# Patient Record
Sex: FEMALE | Race: ASIAN | Employment: UNEMPLOYED | URBAN - METROPOLITAN AREA
[De-identification: names, ages, dates, MRNs, and addresses within clinical notes are randomized per-mention and may not be internally consistent; named-entity substitution may affect disease eponyms.]

---

## 2017-01-23 ENCOUNTER — OFFICE VISIT (OUTPATIENT)
Dept: ENDOCRINOLOGY | Age: 62
End: 2017-01-23

## 2017-01-23 VITALS
BODY MASS INDEX: 28.66 KG/M2 | HEIGHT: 61 IN | SYSTOLIC BLOOD PRESSURE: 125 MMHG | DIASTOLIC BLOOD PRESSURE: 74 MMHG | HEART RATE: 88 BPM | WEIGHT: 151.8 LBS

## 2017-01-23 DIAGNOSIS — E78.5 HYPERLIPIDEMIA LDL GOAL <100: ICD-10-CM

## 2017-01-23 DIAGNOSIS — L65.9 HAIR LOSS: ICD-10-CM

## 2017-01-23 DIAGNOSIS — E55.9 VITAMIN D DEFICIENCY: ICD-10-CM

## 2017-01-23 DIAGNOSIS — E83.52 HYPERCALCEMIA: ICD-10-CM

## 2017-01-23 DIAGNOSIS — I10 ESSENTIAL HYPERTENSION, BENIGN: ICD-10-CM

## 2017-01-23 LAB — HBA1C MFR BLD HPLC: 7.9 %

## 2017-01-23 RX ORDER — PRAVASTATIN SODIUM 40 MG/1
TABLET ORAL
Qty: 90 TAB | Refills: 3 | Status: SHIPPED | OUTPATIENT
Start: 2017-01-23 | End: 2017-01-23 | Stop reason: SDUPTHER

## 2017-01-23 RX ORDER — LISINOPRIL 20 MG/1
TABLET ORAL
Qty: 90 TAB | Refills: 3 | Status: SHIPPED | OUTPATIENT
Start: 2017-01-23 | End: 2018-02-22 | Stop reason: SDUPTHER

## 2017-01-23 RX ORDER — METFORMIN HYDROCHLORIDE 1000 MG/1
TABLET ORAL
Qty: 180 TAB | Refills: 3 | Status: SHIPPED | OUTPATIENT
Start: 2017-01-23 | End: 2018-02-22 | Stop reason: SDUPTHER

## 2017-01-23 RX ORDER — GEMFIBROZIL 600 MG/1
TABLET, FILM COATED ORAL
Qty: 180 TAB | Refills: 3 | Status: SHIPPED | OUTPATIENT
Start: 2017-01-23 | End: 2017-07-10 | Stop reason: SINTOL

## 2017-01-23 RX ORDER — PRAVASTATIN SODIUM 40 MG/1
TABLET ORAL
Qty: 90 TAB | Refills: 3
Start: 2017-01-23 | End: 2018-02-22 | Stop reason: SDUPTHER

## 2017-01-23 NOTE — MR AVS SNAPSHOT
Visit Information Date & Time Provider Department Dept. Phone Encounter #  
 1/23/2017 11:50 AM Micki Monique, [address] [facility] and Endocrinology  Follow-up Instructions Return in about 5 months (around 6/23/2017).  
  
 2/28/2017  4:40 PM  
EWL OVER 50 with Marcelina Reid MD  
[address] ([address] [facility] [address] [facility] [address] [facility] [id number]  
[phone]  
  
   
 [address] [facility]   [facility] [address] [facility] Upcoming Health Maintenance Date Due Hepatitis C Screening 1955 COLONOSCOPY 5/1/1973 Pneumococcal 19-64 Medium Risk (1 of 1 - PPSV23) 5/1/1974 DTaP/Tdap/Td series (1 - Tdap) 5/1/1976 ZOSTER VACCINE AGE 60> 5/1/2015 INFLUENZA AGE 9 TO ADULT 8/1/2016 HEMOGLOBIN A1C Q6M 11/9/2016 EYE EXAM RETINAL OR DILATED Q1 3/31/2017 FOOT EXAM Q1 5/9/2017 MICROALBUMIN Q1 5/9/2017 LIPID PANEL Q1 5/9/2017 PAP AKA CERVICAL CYTOLOGY 1/10/2018 BREAST CANCER SCRN MAMMOGRAM 2/23/2018 Allergies as of 1/23/2017  Review Complete On: 1/23/2017 By: Micki Monique MD  
  
 Severity Noted Reaction Type Reactions Carrot Medium 11/03/2010   Systemic Swelling Simvastatin  07/22/2011    Myalgia Current Immunizations  Reviewed on 11/24/2014 No immunizations on file. Not reviewed this visit You Were Diagnosed With   
  
 Codes Comments Uncontrolled type 2 diabetes mellitus with complication, with long-term current use of insulin (HCC)    -  Primary ICD-10-CM: E11.8, E11.65, Z79.4 ICD-9-CM: 250.82, V58.67 Essential hypertension, benign     ICD-10-CM: I10 
ICD-9-CM: 401.1 Hyperlipidemia LDL goal <100     ICD-10-CM: E78.5 ICD-9-CM: 272.4 Hair loss     ICD-10-CM: L65.9 ICD-9-CM: 704.00 Vitals  BP Pulse Height(growth percentile) Weight(growth percentile) LMP BMI  
 125/74 (BP 1 Location: Left arm, BP Patient Position: Sitting) 88 5' 1\" (1.549 m) 151 lb 12.8 oz (68.9 kg) 06/28/2010 28.68 kg/m2 OB Status Smoking Status Postmenopausal Former Smoker Vitals History BMI and BSA Data Body Mass Index Body Surface Area  
 28.68 kg/m 2 1.72 m 2 Preferred Pharmacy Pharmacy Name Phone [facility] Pavithra Casper [address] [place] [address] Kayley Dotson [phone] Your Updated Medication List  
  
   
This list is accurate as of: 1/23/17  1:09 PM.  Always use your most recent med list.  
  
  
  
  
 aspirin 81 mg tablet Take 81 mg by mouth daily. CO Q-10 100 mg capsule Generic drug:  co-enzyme Q-10 Take 100 mg by mouth daily. dulaglutide 0.75 mg/0.5 mL sub-q pen Commonly known as:  TRULICITY  
0.5 mL by SubCUTAneous route every seven (7) days. FREESTYLE LITE METER monitoring kit Generic drug:  Blood-Glucose Meter Test 4 times daily--Dx: E11.65 FREESTYLE LITE STRIPS strip Generic drug:  glucose blood VI test strips Test 4 times daily--Dx: E11.65--[place]  
  
 gemfibrozil 600 mg tablet Commonly known as:  LOPID TAKE ONE TABLET BY MOUTH TWICE DAILY  
  
 insulin glargine 100 unit/mL (3 mL) pen Commonly known as:  LANTUS SOLOSTAR  
45 Units by SubCUTAneous route. insulin lispro 100 unit/mL kwikpen Commonly known as:  HumaLOG KwikPen  
by SubCUTAneous route. 3 U PER 15 G OF CARBS  
  
 lisinopril 20 mg tablet Commonly known as:  PRINIVIL, ZESTRIL  
TAKE ONE TABLET BY MOUTH ONCE DAILY. metFORMIN 1,000 mg tablet Commonly known as:  GLUCOPHAGE  
TAKE ONE TABLET BY MOUTH TWICE DAILY WITH MEALS  
  
 phentermine 37.5 mg tablet Commonly known as:  ADIPEX-P Take 1 tablet before breakfast  
  
 pravastatin 40 mg tablet Commonly known as:  PRAVACHOL  
TAKE 1/2 TABLET BY MOUTH ONCE DAILY--Dose change 1/23/17--updated med list--did not send prescription to the pharmacy Prescriptions Sent to Pharmacy Refills  
 lisinopril (PRINIVIL, ZESTRIL) 20 mg tablet 3 Sig: TAKE ONE TABLET BY MOUTH ONCE DAILY. Class: Normal  
 Pharmacy: [facility], [address] Ph #: [phone] We Performed the Following AMB POC HEMOGLOBIN A1C [21708 CPT(R)] CBC W/O DIFF [62502 CPT(R)] LIPID PANEL [51711 CPT(R)] METABOLIC PANEL, COMPREHENSIVE [28580 CPT(R)] IN COLLECTION VENOUS BLOOD,VENIPUNCTURE [id number] CPT(R)] IN HANDLG&/OR CONVEY OF SPEC FOR TR OFFICE TO LAB [74404 CPT(R)] TSH 3RD GENERATION [81674 CPT(R)] VITAMIN D, 25 HYDROXY [id number] CPT(R)] Follow-up Instructions Return in about 5 months (around 6/23/2017). Patient Instructions 1) Try taking 1/2 tab of pravastatin at night to see if this has any effect on memory. 2)  I will send you a message through [employer] with your lab results. 3) Your Hemoglobin A1c is a 3 month marker of your diabetes control. Goal is less than 7% which means your average blood sugar is less than 150. Your Hemoglobin A1c is 7.9% which means your diabetes is under slightly worse control than 7.8% at your last check. Continue to work on your diet and exercise and take all your medications (metformin and lantus and humalog) as directed. 4) We will try Trulicity. Take this once a week either in the morning or in the evening and it doesn't matter if this is before or after a meal.  The most common side effect is nausea. Watch out for any severe abdominal pain that goes to the back and is associated with nausea or vomiting as this may be due to pancreatitis which is the most severe but rarest side effect of this medication. Please notify me if this occurs.   Take your 2nd sample pen dose in one week and if you are tolerating this, let me know and I can try to contact Radha to see if we can add this on to your patient assistance program. 
 
 5) Decrease the humalog to 10 units with meals. If you are finding that you are having low sugars under 90 more than 2 times a week, then cut back by 2 units on the humalog as needed until you get to 4 units with meals and if still well controlled with readings under 90, then stop the humalog. 6) I will check you for anemia and thyroid and vitamin D for other causes of hair loss. Introducing [facility]! Dear [address] [place] [address]: 
Thank you for requesting a [employer] account. Our records indicate that you already have an active [employer] account. You can access your account anytime at [email or web address] Did you know that you can access your hospital and ER discharge instructions at any time in [employer]? You can also review all of your test results from your hospital stay or ER visit. Additional Information If you have questions, please visit the Frequently Asked Questions section of the [employer] website at [email or web address] Remember, [employer] is NOT to be used for urgent needs. For medical emergencies, dial 911. Now available from your iPhone and Android! Please provide this summary of care documentation to your next provider. Your primary care clinician is listed as [place]. If you have any questions after today's visit, please call [phone].

## 2017-01-23 NOTE — PROGRESS NOTES
Chief Complaint   Patient presents with    Diabetes     pcp and pharmacy confirmed    Labs     drawn. .. POC done     History of Present Illness: Maria Teresa Boswell is a 64 y.o. female here for follow up of diabetes. Weight down 7 lbs since last visit in 5/16. Her 81 yo mother is still dealing with brain tumors. Her 58 yo brother passed away in 9/16 of MI. Her youngest brother has amputation from DM and recently suffered a subarachnoid hemorrhage after a car accident. Never went up to 50 of lantus and stayed on 45 the whole time and tried to watch her portions. Moved her lisinopril to bedtime and takes with her pravastatin. Fasting sugars are in the 120-130s. Has had more trouble trouble with hair loss and memory loss with some word finding difficulty. She is interested in trying trulicity as she is already on the [place] pt assistance program.  We gave her the first injection of 0.75 mg today in the office. Current Outpatient Prescriptions   Medication Sig    metFORMIN (GLUCOPHAGE) 1,000 mg tablet TAKE ONE TABLET BY MOUTH TWICE DAILY WITH MEALS    gemfibrozil (LOPID) 600 mg tablet TAKE ONE TABLET BY MOUTH TWICE DAILY    phentermine (ADIPEX-P) 37.5 mg tablet Take 1 tablet before breakfast    pravastatin (PRAVACHOL) 40 mg tablet TAKE ONE TABLET BY MOUTH ONCE DAILY.  lisinopril (PRINIVIL, ZESTRIL) 20 mg tablet TAKE ONE TABLET BY MOUTH ONCE DAILY.  FREESTYLE LITE STRIPS strip Test 4 times daily--Dx: E11.65--[place]    FREESTYLE LITE METER monitoring kit Test 4 times daily--Dx: E11.65    insulin glargine (LANTUS SOLOSTAR) 100 unit/mL (3 mL) pen 45 Units by SubCUTAneous route.  insulin lispro (HUMALOG KWIKPEN) 100 unit/mL kwikpen by SubCUTAneous route. 3 U PER 15 G OF CARBS    coenzyme q10 (CO Q-10) 100 mg Cap Take 100 mg by mouth daily.  aspirin 81 mg tablet Take 81 mg by mouth daily. No current facility-administered medications for this visit. Allergies   Allergen Reactions    Carrot Swelling    Simvastatin Myalgia     Review of Systems:  - Eyes: no blurry vision or double vision  - Cardiovascular: no chest pain  - Respiratory: no shortness of breath  - Musculoskeletal: no myalgias  - Neurological: no numbness/tingling in extremities    Physical Examination:  Blood pressure 125/74, pulse 88, height 5' 1\" (1.549 m), weight 151 lb 12.8 oz (68.9 kg), last menstrual period 06/28/2010.  - General: pleasant, no distress, good eye contact   - Neck: no carotid bruits  - Cardiovascular: regular, normal rate, nl s1 and s2, no m/r/g,   - Respiratory: clear bilaterally  - Integumentary: no edema,   - Psychiatric: normal mood and affect    Data Reviewed:   Component      Latest Ref Rng & Units 1/23/2017          12:27 PM   Hemoglobin A1c (POC)      % 7.9       Assessment/Plan:     1. DM w/o complication type II, uncontrolled (250.02) her most recent Hgb A1c was 7.9% in 1/17 up from 7.8% in 5/16 down from 8.9% in 2/16 up from 8.1% in 5/15 up from 7.5% in 1/15 down from 8.1% in 7/14 up from 7.1% in 4/14 in [facility] down from 7.2% in 11/13 up from 6.9% in 8/13 down from 7.3% in 4/13 up from 6.9% in Nov up from 6.2% in August down from 6.5% in April up from 6.4% in December down from 6.9% in May 2011 down from 7.9% in November 2010 down from 9.2% in March 2010 prior to starting insulin. Will try trulicity to help with weight loss and cut back on insulin requirements. - cont Lantus 45 units at bedtime     - cont Humalog 3 units for 15 grams of carb--then change to novolog--cut back on this as below  - cont Metformin 1g bid  - begin trulicity 5.84 mg weekly  - foot exam done 5/16  - optho UTD 4/13  - microalbumin 153 11/10 down to 30.1 in 1/12, up to 58 in 4/13 (increased lis to 20 at that time) and down to 25 in 8/13, up to 47 in 5/15 and stable at 47 in 5/16  - check bs 3-4x day   - check Hgb A1c and cmp and microalbumin at next visit     2.  Unspecified essential hypertension (401.9) her BP was at goal < 140/90   - cont lisinopril 20 mg daily     3. Other and unspecified hyperlipidemia (272.4) Given DM, Goal LDL < 100, non-HDL < 130, and TG < 150. Myalgias with simvastatin. Was changed from gemfibrozil to pravastatin in May 2012.  with non-HDL of 149 at that time off therapy down to 62 and 126 in August. TGs > 500 in 11/12 and down to 353 in 4/13 but up to 741 in 8/13 so restarted gemfibrozil at that time. TG down to 285 in 11/13. Up to 343 in 7/14. Down to 183 in 1/15. LDL 85 and  in 5/15.  and  in 2/16. LDL 65 and  in 5/16 with lower A1c. Having some memory loss so will try lower dose of pravastatin to see if this helps. - cont gemfibrozil 600 mg bid  - decrease prava to 1/2 of 40 mg daily  - check lipids today     4. Unspecified vitamin D deficiency (268.9) Level was 19 in November 2010 on 2000 units daily so increased to 4000 units daily and it was up to 26.3 in May 2011. On 5000 units daily her level was 24 in January 2012 so I increased her to 10,000 units daily at that time and level was 68 in April 2012 and 62 in August and 72 in November and 69 in 4/13 so decreased her dose to 5000 units at that time and level 40 in 8/13. Calcium level was up in 11/13 to 11.0 so stopped vitamin D at that time and level 32 in 5/15. Down to 24 in 2/16  - no vitamin D for now  - check Vitamin D 25-OH level today       5. Hypercalcemia: Had a level of 10.4 in 1/12 and no other abnormal values until 10.5 in 8/13 and up to 11 in 11/13. Stopped vitamin D and mvi at that time. Down to 10 in 6/14. Found to have a 2 mm stone on CT scan in [facility] in 4/14. Will hold on further evaluation given she doesn't have insurance at this time but will plan on drawing a PTH level in the future. Repeat calcium was 11 in 1/15 and 10.4 in 5/15. Up to 10.9 in 2/16 but down to 10.2 in 5/16.    - follow on cmp    6.   Obesity: weight down 8 lbs from 5/15 to 2/16 and 2 lbs by 5/16 but only taking 1/2 tab in am consistently and down 5 lbs by 1/17. Trulicity should help too. - cont phentermine 37.5 mg but take 1 whole tab daily  - trulicity as above        Patient Instructions   1) Try taking 1/2 tab of pravastatin at night to see if this has any effect on memory. 2)  I will send you a message through [employer] with your lab results. 3) Your Hemoglobin A1c is a 3 month marker of your diabetes control. Goal is less than 7% which means your average blood sugar is less than 150. Your Hemoglobin A1c is 7.9% which means your diabetes is under slightly worse control than 7.8% at your last check. Continue to work on your diet and exercise and take all your medications (metformin and lantus and humalog) as directed. 4) We will try Trulicity. Take this once a week either in the morning or in the evening and it doesn't matter if this is before or after a meal.  The most common side effect is nausea. Watch out for any severe abdominal pain that goes to the back and is associated with nausea or vomiting as this may be due to pancreatitis which is the most severe but rarest side effect of this medication. Please notify me if this occurs. Take your 2nd sample pen dose in one week and if you are tolerating this, let me know and I can try to contact Radha to see if we can add this on to your patient assistance program.    5) Decrease the humalog to 10 units with meals. If you are finding that you are having low sugars under 90 more than 2 times a week, then cut back by 2 units on the humalog as needed until you get to 4 units with meals and if still well controlled with readings under 90, then stop the humalog. 6) I will check you for anemia and thyroid and vitamin D for other causes of hair loss. Follow-up Disposition:  Return in about 5 months (around 6/23/2017).     Copy sent to:  Dr. Richard Salazar via [facility]    Lab follow up: 1/24/17    Component      Latest Ref Rng & Units 1/23/2017 1/23/2017 1/23/2017 1/23/2017           4:59 PM  4:59 PM  4:59 PM  4:59 PM   Glucose      65 - 99 mg/dL 129 (H)      BUN      8 - 27 mg/dL 16      Creatinine      0.57 - 1.00 mg/dL 0.68      GFR est non-AA      >59 mL/min/1.73 95      GFR est AA      >59 mL/min/1.73 109      BUN/Creatinine ratio      11 - 26 24      Sodium      134 - 144 mmol/L 135      Potassium      3.5 - 5.2 mmol/L 5.1      Chloride      96 - 106 mmol/L 96      CO2      18 - 29 mmol/L 20      Calcium      8.7 - 10.3 mg/dL 10.2      Protein, total      6.0 - 8.5 g/dL 8.1      Albumin      3.6 - 4.8 g/dL 4.7      GLOBULIN, TOTAL      1.5 - 4.5 g/dL 3.4      A-G Ratio      1.1 - 2.5 1.4      Bilirubin, total      0.0 - 1.2 mg/dL 0.4      Alk.  phosphatase      39 - 117 IU/L 100      AST      0 - 40 IU/L 31      ALT      0 - 32 IU/L 30      WBC      3.4 - 10.8 x10E3/uL    8.0   RBC      3.77 - 5.28 x10E6/uL    4.69   HGB      11.1 - 15.9 g/dL    14.2   HCT      34.0 - 46.6 %    41.9   MCV      79 - 97 fL    89   MCH      26.6 - 33.0 pg    30.3   MCHC      31.5 - 35.7 g/dL    33.9   RDW      12.3 - 15.4 %    12.4   PLATELET      699 - 871 x10E3/uL    335   Cholesterol, total      100 - 199 mg/dL  160     Triglyceride      0 - 149 mg/dL  152 (H)     HDL Cholesterol      >39 mg/dL  43     VLDL, calculated      5 - 40 mg/dL  30     LDL, calculated      0 - 99 mg/dL  87     VITAMIN D, 25-HYDROXY      30.0 - 100.0 ng/mL   29.6 (L)      Component      Latest Ref Rng & Units 1/23/2017           4:59 PM   TSH      0.450 - 4.500 uIU/mL 1.310     Sent her the following message through [employer]:    TSH is a thyroid test.  Your level is normal so you don't have any problem with your thyroid at this time that needs further evaluation or treatment.   -------------------------------------------------------------------------------------------------------------------  Total Cholesterol is the total number of cholesterol particles in your blood. Goal is less than 200. Your value is at goal.    Triglycerides are the short term fats in your blood. Goal is less than 150. Your value is just above goal.    HDL is the good cholesterol in your blood. Goal is more than 50. Your value is below goal.    LDL is the bad cholesterol in your blood. Goal is less than 100. Your value is at goal.    As we discussed, I still think it makes sense to try 1/2 tab of pravastatin to see if this has any effect on your memory. Continue to follow a low cholesterol diet. Try to limit the amount of fried foods, fatty foods, butter, gravy, red meat, ice cream, cheese, and eggs in your diet, which are all high in cholesterol. Take all of your medications (pravastatin) as directed.  -------------------------------------------------------------------------------------------------------------------  BUN and creatinine are markers of kidney function. Your values are normal.  -------------------------------------------------------------------------------------------------------------------  ALT and AST are markers of liver function. Your values are normal.  -------------------------------------------------------------------------------------------------------------------  Your vitamin D level is 29.6 which is just slightly low. Goal is over 30. It's unclear if this could be contributing to hair loss. Previously we had stopped the vitamin D because your calcium level was high as vitamin D can cause your calcium level to go higher. Now that your calcium level remains normal, I think it's fine to try taking just 1000 units of vitamin D3 daily to see if this helps with any hair loss.    -------------------------------------------------------------------------------------------------------------------  Your CBC (complete blood count) was normal so you have no evidence of anemia as the cause of hair loss.

## 2017-01-23 NOTE — PATIENT INSTRUCTIONS
1) Try taking 1/2 tab of pravastatin at night to see if this has any effect on memory. 2)  I will send you a message through [employer] with your lab results. 3) Your Hemoglobin A1c is a 3 month marker of your diabetes control. Goal is less than 7% which means your average blood sugar is less than 150. Your Hemoglobin A1c is 7.9% which means your diabetes is under slightly worse control than 7.8% at your last check. Continue to work on your diet and exercise and take all your medications (metformin and lantus and humalog) as directed. 4) We will try Trulicity. Take this once a week either in the morning or in the evening and it doesn't matter if this is before or after a meal.  The most common side effect is nausea. Watch out for any severe abdominal pain that goes to the back and is associated with nausea or vomiting as this may be due to pancreatitis which is the most severe but rarest side effect of this medication. Please notify me if this occurs. Take your 2nd sample pen dose in one week and if you are tolerating this, let me know and I can try to contact Radha to see if we can add this on to your patient assistance program.    5) Decrease the humalog to 10 units with meals. If you are finding that you are having low sugars under 90 more than 2 times a week, then cut back by 2 units on the humalog as needed until you get to 4 units with meals and if still well controlled with readings under 90, then stop the humalog. 6) I will check you for anemia and thyroid and vitamin D for other causes of hair loss.

## 2017-01-24 LAB
25(OH)D3+25(OH)D2 SERPL-MCNC: 29.6 NG/ML (ref 30–100)
ALBUMIN SERPL-MCNC: 4.7 G/DL (ref 3.6–4.8)
ALBUMIN/GLOB SERPL: 1.4 {RATIO} (ref 1.1–2.5)
ALP SERPL-CCNC: 100 IU/L (ref 39–117)
ALT SERPL-CCNC: 30 IU/L (ref 0–32)
AST SERPL-CCNC: 31 IU/L (ref 0–40)
BILIRUB SERPL-MCNC: 0.4 MG/DL (ref 0–1.2)
BUN SERPL-MCNC: 16 MG/DL (ref 8–27)
BUN/CREAT SERPL: 24 (ref 11–26)
CALCIUM SERPL-MCNC: 10.2 MG/DL (ref 8.7–10.3)
CHLORIDE SERPL-SCNC: 96 MMOL/L (ref 96–106)
CHOLEST SERPL-MCNC: 160 MG/DL (ref 100–199)
CO2 SERPL-SCNC: 20 MMOL/L (ref 18–29)
CREAT SERPL-MCNC: 0.68 MG/DL (ref 0.57–1)
ERYTHROCYTE [DISTWIDTH] IN BLOOD BY AUTOMATED COUNT: 12.4 % (ref 12.3–15.4)
GLOBULIN SER CALC-MCNC: 3.4 G/DL (ref 1.5–4.5)
GLUCOSE SERPL-MCNC: 129 MG/DL (ref 65–99)
HCT VFR BLD AUTO: 41.9 % (ref 34–46.6)
HDLC SERPL-MCNC: 43 MG/DL
HGB BLD-MCNC: 14.2 G/DL (ref 11.1–15.9)
INTERPRETATION, 910389: NORMAL
LDLC SERPL CALC-MCNC: 87 MG/DL (ref 0–99)
MCH RBC QN AUTO: 30.3 PG (ref 26.6–33)
MCHC RBC AUTO-ENTMCNC: 33.9 G/DL (ref 31.5–35.7)
MCV RBC AUTO: 89 FL (ref 79–97)
PLATELET # BLD AUTO: 335 X10E3/UL (ref 150–379)
POTASSIUM SERPL-SCNC: 5.1 MMOL/L (ref 3.5–5.2)
PROT SERPL-MCNC: 8.1 G/DL (ref 6–8.5)
RBC # BLD AUTO: 4.69 X10E6/UL (ref 3.77–5.28)
SODIUM SERPL-SCNC: 135 MMOL/L (ref 134–144)
TRIGL SERPL-MCNC: 152 MG/DL (ref 0–149)
TSH SERPL DL<=0.005 MIU/L-ACNC: 1.31 UIU/ML (ref 0.45–4.5)
VLDLC SERPL CALC-MCNC: 30 MG/DL (ref 5–40)
WBC # BLD AUTO: 8 X10E3/UL (ref 3.4–10.8)

## 2017-01-24 RX ORDER — MELATONIN
2000 DAILY
COMMUNITY

## 2017-01-26 ENCOUNTER — TELEPHONE (OUTPATIENT)
Dept: ENDOCRINOLOGY | Age: 62
End: 2017-01-26

## 2017-01-26 NOTE — TELEPHONE ENCOUNTER
----- Message from Dorcas Yousif LPN sent at 6/07/9295  1:48 PM EST -----  Regarding: Humalog arrived  PAP Humalog arrived today. ----    Pt notified.

## 2017-02-01 ENCOUNTER — HOSPITAL ENCOUNTER (OUTPATIENT)
Dept: GENERAL RADIOLOGY | Age: 62
Discharge: HOME | End: 2017-02-01
Payer: SUBSIDIZED

## 2017-02-01 ENCOUNTER — OFFICE VISIT (OUTPATIENT)
Dept: FAMILY MEDICINE CLINIC | Age: 62
End: 2017-02-01

## 2017-02-01 ENCOUNTER — TELEPHONE (OUTPATIENT)
Dept: FAMILY MEDICINE CLINIC | Age: 62
End: 2017-02-01

## 2017-02-01 VITALS
BODY MASS INDEX: 29.72 KG/M2 | RESPIRATION RATE: 19 BRPM | TEMPERATURE: 98.4 F | SYSTOLIC BLOOD PRESSURE: 142 MMHG | HEIGHT: 61 IN | HEART RATE: 82 BPM | DIASTOLIC BLOOD PRESSURE: 84 MMHG | WEIGHT: 157.4 LBS | OXYGEN SATURATION: 96 %

## 2017-02-01 DIAGNOSIS — R68.89 FLU-LIKE SYMPTOMS: ICD-10-CM

## 2017-02-01 DIAGNOSIS — J02.0 STREP THROAT: Primary | ICD-10-CM

## 2017-02-01 DIAGNOSIS — J06.9 URI WITH COUGH AND CONGESTION: ICD-10-CM

## 2017-02-01 LAB
QUICKVUE INFLUENZA TEST: NEGATIVE
S PYO AG THROAT QL: POSITIVE
VALID INTERNAL CONTROL?: YES
VALID INTERNAL CONTROL?: YES

## 2017-02-01 PROCEDURE — 71020 XR CHEST PA LAT: CPT

## 2017-02-01 RX ORDER — AZITHROMYCIN 250 MG/1
TABLET, FILM COATED ORAL
Qty: 6 TAB | Refills: 0 | Status: SHIPPED | OUTPATIENT
Start: 2017-02-01 | End: 2017-04-06 | Stop reason: ALTCHOICE

## 2017-02-01 RX ORDER — AMOXICILLIN AND CLAVULANATE POTASSIUM 875; 125 MG/1; MG/1
1 TABLET, FILM COATED ORAL EVERY 12 HOURS
Qty: 20 TAB | Refills: 0 | Status: SHIPPED | OUTPATIENT
Start: 2017-02-01 | End: 2017-02-01

## 2017-02-01 RX ORDER — AMOXICILLIN AND CLAVULANATE POTASSIUM 875; 125 MG/1; MG/1
TABLET, FILM COATED ORAL EVERY 12 HOURS
COMMUNITY
End: 2017-04-06 | Stop reason: ALTCHOICE

## 2017-02-01 NOTE — PROGRESS NOTES
Phill Benitez is a 64 y.o. female who presents to the office today with the following:  Chief Complaint   Patient presents with    Sore Throat     patient has sore throat, fever, body aches, headache, cough       HPI  Sat running fevers on/off, once as high as 104.0F  Also c/o sore throat, hurts to swallow, feels very weak. Coughing up phlegm, unsure color. Also frequent HAs. Has been taking 500mg Tylenol for fever/ache. Also tried IBU initially, then switched. Did not receive flu vaccination. Hx Insulin dep [id number] started Trulicity last wk. Recently cared for young sick relative with similar sxs. Review of Systems   Constitutional: Positive for chills, fever and malaise/fatigue. HENT: Positive for congestion (but nose is running) and sore throat. Negative for ear pain. Eyes: Negative. Respiratory: Positive for cough. Negative for shortness of breath and wheezing. Cardiovascular: Negative for chest pain. Gastrointestinal: Negative for abdominal pain (denies, but notes some superficial soreness due to new injectable DM med (Trulicity)), diarrhea, nausea and vomiting. Genitourinary: Negative. Musculoskeletal: Positive for myalgias. Skin: Negative for rash. Neurological: Positive for headaches. Allergies   Allergen Reactions    Carrot Swelling    Apple Swelling     Apple skin causes her lips to swell    Simvastatin Myalgia       Current Outpatient Prescriptions   Medication Sig    cholecalciferol (VITAMIN D3) 1,000 unit tablet Take  by mouth daily.  metFORMIN (GLUCOPHAGE) 1,000 mg tablet TAKE ONE TABLET BY MOUTH TWICE DAILY WITH MEALS    gemfibrozil (LOPID) 600 mg tablet TAKE ONE TABLET BY MOUTH TWICE DAILY    lisinopril (PRINIVIL, ZESTRIL) 20 mg tablet TAKE ONE TABLET BY MOUTH ONCE DAILY.     pravastatin (PRAVACHOL) 40 mg tablet TAKE 1/2 TABLET BY MOUTH ONCE DAILY--Dose change 1/23/17--updated med list--did not send prescription to the pharmacy    dulaglutide (TRULICITY) 3.53 PL/6.4 mL sub-q pen 0.5 mL by SubCUTAneous route every seven (7) days.  phentermine (ADIPEX-P) 37.5 mg tablet Take 1 tablet before breakfast    FREESTYLE LITE STRIPS strip Test 4 times daily--Dx: E11.65--90 day supply as [place] the [place]    FREESTYLE LITE METER monitoring kit Test 4 times daily--Dx: E11.65    insulin glargine (LANTUS SOLOSTAR) 100 unit/mL (3 mL) pen 45 Units by SubCUTAneous route.  insulin lispro (HUMALOG KWIKPEN) 100 unit/mL kwikpen by SubCUTAneous route. 3 U PER 15 G OF CARBS    coenzyme q10 (CO Q-10) 100 mg Cap Take 100 mg by mouth daily.  aspirin 81 mg tablet Take 81 mg by mouth daily. No current facility-administered medications for this visit. Past Medical History   Diagnosis Date    Chest pain 2012     Saw Dr. Bouchra Ely, negative stress test    Diabetes [facility])     Diverticulosis      seen on CT scan    Hyperlipidemia     Hypertension     Obesity     Sensory neuropathy 2012     Tingling L toe    Tendonitis, Achilles, left 2012    Type II or unspecified type diabetes mellitus without mention of complication, uncontrolled     Vitamin D deficiency        Past Surgical History   Procedure Laterality Date    Pr tympanoplasty       left       Social History     Social History    Marital status:      Spouse name: N/A    Number of children: N/A    Years of education: N/A     Social History Main Topics    Smoking status: Former Smoker     Packs/day: 0.50     Years: 20.00     Quit date: 11/3/2004    Smokeless tobacco: Never Used    Alcohol use No    Drug use: No    Sexual activity: Yes     Partners: Male     Other Topics Concern    None     Social History Narrative    Lives in [place] with  of 10 years. Has a 33 yo daughter from a previous marriage. Used to work as a  for [employer] and a [employer] in [place] and for [employer]. Likes to read, cook, and shop.         Family History   Problem Relation Age of Onset    Diabetes Father     Heart Disease Father 46     MI    Diabetes Brother     Diabetes Other      multiple family members on father's side    Diabetes Brother     Diabetes Brother     Stroke Neg Hx          Physical Exam:  Visit Vitals    /84 (BP 1 Location: Right arm, BP Patient Position: Sitting)    Pulse 82    Temp 98.4 °F (36.9 °C) (Temporal)    Resp 19    Ht 5' 1\" (1.549 m)    Wt 157 lb 6.4 oz (71.4 kg)    LMP 06/28/2010    SpO2 96%    BMI 29.74 kg/m2     Physical Exam   Constitutional: She is oriented to person, place, and time and well-developed, well-nourished, and in no distress. HENT:   Head: Normocephalic and atraumatic. Right Ear: Tympanic membrane, external ear and ear canal normal.   Left Ear: Tympanic membrane, external ear and ear canal normal.   Nose: Mucosal edema present. Right sinus exhibits no maxillary sinus tenderness and no frontal sinus tenderness. Left sinus exhibits no maxillary sinus tenderness and no frontal sinus tenderness. Mouth/Throat: Uvula is midline and mucous membranes are normal. Posterior oropharyngeal erythema present. No oropharyngeal exudate, posterior oropharyngeal edema or tonsillar abscesses. Eyes: Conjunctivae are normal.   Neck: Normal range of motion. Neck supple. Cardiovascular: Normal rate, regular rhythm and normal heart sounds. Pulmonary/Chest: Effort normal. No respiratory distress. She has no decreased breath sounds. She has no wheezes. She has no rhonchi. She has rales (faint end exp, diffuse left and base of RLL). Abdominal: Soft. There is no tenderness. Lymphadenopathy:     She has no cervical adenopathy. Neurological: She is alert and oriented to person, place, and time. Gait normal.   Skin: Skin is warm and dry. Psychiatric: Mood and affect normal.   Nursing note and vitals reviewed.       Assessment/Plan:    ICD-10-CM ICD-9-CM    1. Strep throat J02.0 034.0 AMB POC RAPID STREP A      amoxicillin-clavulanate (AUGMENTIN) 875-125 mg per tablet   2. URI with cough and congestion J06.9 465.9 XR CHEST PA LAT      amoxicillin-clavulanate (AUGMENTIN) 875-125 mg per tablet   3. Flu-like symptoms R68.89 780.99 AMB POC RAPID INFLUENZA TEST      XR CHEST PA LAT     Results for orders placed or performed in visit on 02/01/17   AMB POC RAPID STREP A   Result Value Ref Range    VALID INTERNAL CONTROL POC Yes     Group A Strep Ag Positive Negative   AMB POC RAPID INFLUENZA TEST   Result Value Ref Range    VALID INTERNAL CONTROL POC Yes     QuickVue Influenza test Negative Negative     Encourage rest & fluids. Warm salt water gargles. Cont to monitor fever, Tylenol prn. Discussed otc medications for symptomatic relief. RTO if sxs persist/worsen or develops any additional sxs/concerns. Follow-up Disposition:  Return in about 1 week (around 2/8/2017), or rtc sooner if symptoms worsen or fail to improve, for f/u with PCP.     Edna Toussaint PA-C

## 2017-02-01 NOTE — PROGRESS NOTES
Discussed results with pt, counseled on both atelectasis and pneumonia, that is unclear which one on XR. Pt reports she actually was able to  the Augmentin (took Rx to a different pharmacy, more affordable) and has started it already. I informed her a Sandoval Pop was sent to [facility] as well. Due to potential pneumonia and hx DM I rec she take both and f/u with me or her PCP next week. Rec repeat chest xr to ensure atelectasis vs pneum has resolved. Also counseled on atelectasis and rec deep breathing ex QID. Encouraged to rtc or seek immediate med attn if any sxs worsen.

## 2017-02-01 NOTE — PROGRESS NOTES
Chief Complaint   Patient presents with    Sore Throat     patient has sore throat, fever, body aches, headache, cough     Morning meds taken this Ymrna Night, LPN

## 2017-02-01 NOTE — MR AVS SNAPSHOT
Visit Information Date & Time Provider Department Dept. Phone Encounter #  
 2/1/2017 11:00 AM Jb Patel PA-C [facility] [id number] Follow-up Instructions Return in about 1 week (around 2/8/2017), or rtc sooner if symptoms worsen or fail to improve, for f/u with PCP. Your Appointments 6/23/2017 10:30 AM  
Follow Up with MD [facility] Appt Note: 5 month f/u     Diabetes [address] [phone] [address] [facility] [address] [facility]  
  
    
  
 2/28/2017  4:40 PM  
EWL OVER 50 with Rachel Geiger MD  
[address] ([address]) [address] [facility] [address] [facility] 99 [id number] [phone]  
  
   
 [address] [facility] [id number]  [address] Upcoming Health Maintenance Date Due Hepatitis C Screening 1955 COLONOSCOPY 5/1/1973 Pneumococcal 19-64 Medium Risk (1 of 1 - PPSV23) 5/1/1974 DTaP/Tdap/Td series (1 - Tdap) 5/1/1976 ZOSTER VACCINE AGE 60> 5/1/2015 INFLUENZA AGE 9 TO ADULT 8/1/2016 EYE EXAM RETINAL OR DILATED Q1 3/31/2017 FOOT EXAM Q1 5/9/2017 MICROALBUMIN Q1 5/9/2017 HEMOGLOBIN A1C Q6M 7/23/2017 PAP AKA CERVICAL CYTOLOGY 1/10/2018 LIPID PANEL Q1 1/23/2018 BREAST CANCER SCRN MAMMOGRAM 2/23/2018 Allergies as of 2/1/2017  Review Complete On: 2/1/2017 By: Jb Patel PA-C Severity Noted Reaction Type Reactions Carrot Medium 11/03/2010   Systemic Swelling Apple  02/01/2017    Swelling Apple skin causes her lips to swell Simvastatin  07/22/2011    Myalgia Current Immunizations  Reviewed on 11/24/2014 No immunizations on file. Not reviewed this visit You Were Diagnosed With   
  
 Codes Comments Strep throat    -  Primary ICD-10-CM: J02.0 ICD-9-CM: 034.0   
 URI with cough and congestion     ICD-10-CM: J06.9 ICD-9-CM: 465.9 Flu-like symptoms     ICD-10-CM: R68.89 ICD-9-CM: 780.99 Vitals BP Pulse Temp Resp Height(growth percentile) Weight(growth percentile) 142/84 (BP 1 Location: Right arm, BP Patient Position: Sitting) 82 98.4 °F (36.9 °C) (Temporal) 19 5' 1\" (1.549 m) 157 lb 6.4 oz (71.4 kg) LMP SpO2 BMI OB Status Smoking Status 06/28/2010 96% 29.74 kg/m2 Postmenopausal Former Smoker Vitals History BMI and BSA Data Body Mass Index Body Surface Area  
 29.74 kg/m 2 1.75 m 2 Preferred Pharmacy Pharmacy Name [facility] [address] [place] [phone] Your Updated Medication List  
  
   
This list is accurate as of: 2/1/17 12:27 PM.  Always use your most recent med list.  
  
  
  
  
 amoxicillin-clavulanate 875-125 mg per tablet Commonly known as:  AUGMENTIN Take 1 Tab by mouth every twelve (12) hours for 10 days. aspirin 81 mg tablet Take 81 mg by mouth daily. CO Q-10 100 mg capsule Generic drug:  co-enzyme Q-10 Take 100 mg by mouth daily. dulaglutide 0.75 mg/0.5 mL sub-q pen Commonly known as:  TRULICITY  
0.5 mL by SubCUTAneous route every seven (7) days. FREESTYLE LITE METER monitoring kit Generic drug:  Blood-Glucose Meter Test 4 times daily--Dx: E11.65 FREESTYLE LITE STRIPS strip Generic drug:  glucose blood VI test strips Test 4 times daily--Dx: E11.65--90 day supply as [place] the [place]  
  
 gemfibrozil 600 mg tablet Commonly known as:  LOPID TAKE ONE TABLET BY MOUTH TWICE DAILY  
  
 insulin glargine 100 unit/mL (3 mL) pen Commonly known as:  LANTUS SOLOSTAR  
45 Units by SubCUTAneous route. insulin lispro 100 unit/mL kwikpen Commonly known as:  HumaLOG KwikPen  
by SubCUTAneous route. 3 U PER 15 G OF CARBS  
  
 lisinopril 20 mg tablet Commonly known as:  PRINIVIL, ZESTRIL  
TAKE ONE TABLET BY MOUTH ONCE DAILY. metFORMIN 1,000 mg tablet Commonly known as:  GLUCOPHAGE  
TAKE ONE TABLET BY MOUTH TWICE DAILY WITH MEALS  
  
 phentermine 37.5 mg tablet Commonly known as:  ADIPEX-P Take 1 tablet before breakfast  
  
 pravastatin 40 mg tablet Commonly known as:  PRAVACHOL  
TAKE 1/2 TABLET BY MOUTH ONCE DAILY--Dose change 1/23/17--updated med list--did not send prescription to the pharmacy VITAMIN D3 1,000 unit tablet Generic drug:  cholecalciferol Take  by mouth daily. Prescriptions Printed Refills  
 amoxicillin-clavulanate (AUGMENTIN) 875-125 mg per tablet 0 Sig: Take 1 Tab by mouth every twelve (12) hours for 10 days. Class: Print Route: Oral  
  
We Performed the Following AMB POC RAPID INFLUENZA TEST [85580 CPT(R)] AMB POC RAPID STREP A [04356 CPT(R)] Follow-up Instructions Return in about 1 week (around 2/8/2017), or rtc sooner if symptoms worsen or fail to improve, for f/u with PCP. To-Do List   
 02/01/2017 Imaging:  XR CHEST PA LAT Patient Instructions Strep Throat: Care Instructions Your Care Instructions Strep throat is a bacterial infection that causes sudden, severe sore throat and fever. Strep throat, which is caused by bacteria called streptococcus, is treated with antibiotics. Sometimes a strep test is necessary to tell if the sore throat is caused by strep bacteria. Treatment can help ease symptoms and may prevent future problems. Follow-up care is a key part of your treatment and safety. Be sure to make and go to all appointments, and call your doctor if you are having problems. It's also a good idea to know your test results and keep a list of the medicines you take. How can you care for yourself at home? · Take your antibiotics as directed.  Do not stop taking them just because you feel better. You need to take the full course of antibiotics. · Strep throat can spread to others until 24 hours after you begin taking antibiotics. During this time, you should avoid contact with other people at work or home, especially infants and children. Do not sneeze or cough on others, and wash your hands often. Keep your drinking glass and eating utensils separate from those of others, and wash these items well in hot, soapy water. · Gargle with warm salt water at least once each hour to help reduce swelling and make your throat feel better. Use 1 teaspoon of salt mixed in 8 fluid ounces of warm water. · Take an over-the-counter pain medication, such as acetaminophen (Tylenol), ibuprofen (Advil, Motrin), or naproxen (Aleve). Read and follow all instructions on the label. · Try an over-the-counter anesthetic throat spray or throat lozenges, which may help relieve throat pain. · Drink plenty of fluids. Fluids may help soothe an irritated throat. Hot fluids, such as tea or soup, may help your throat feel better. · Eat soft solids and drink plenty of clear liquids. Flavored ice pops, ice cream, scrambled eggs, sherbet, and gelatin dessert (such as Jell-O) may also soothe the throat. · Get lots of rest. 
· Do not smoke, and avoid secondhand smoke. If you need help quitting, talk to your doctor about stop-smoking programs and medicines. These can increase your chances of quitting for good. · Use a vaporizer or humidifier to add moisture to the air in your bedroom. Follow the directions for cleaning the machine. When should you call for help? Call your doctor now or seek immediate medical care if: 
· You have a new or higher fever. · You have a fever with a stiff neck or severe headache. · You have new or worse trouble swallowing. · Your sore throat gets much worse on one side. · Your pain becomes much worse on one side of your throat. Watch closely for changes in your health, and be sure to contact your doctor if: 
· You are not getting better after 2 days (48 hours). · You do not get better as expected. Where can you learn more? Go to http://kerrie-bob.info/. Enter K625 in the search box to learn more about \"Strep Throat: Care Instructions. \" Current as of: July 29, 2016 Content Version: 11.1 © 2006-2016 [employer]. Care instructions adapted under license by [employer] (which disclaims liability or warranty for this information). If you have questions about a medical condition or this instruction, always ask your healthcare professional. [place] [address] any warranty or liability for your use of this information. Upper Respiratory Infection (Cold): Care Instructions Your Care Instructions An upper respiratory infection, or URI, is an infection of the nose, sinuses, or throat. URIs are spread by coughs, sneezes, and direct contact. The common cold is the most frequent kind of URI. The flu and sinus infections are other kinds of URIs. Almost all URIs are caused by viruses. Antibiotics won't cure them. But you can treat most infections with home care. This may include drinking lots of fluids and taking over-the-counter pain medicine. You will probably feel better in 4 to 10 days. The doctor has checked you carefully, but problems can develop later. If you notice any problems or new symptoms, get medical treatment right away. Follow-up care is a key part of your treatment and safety. Be sure to make and go to all appointments, and call your doctor if you are having problems. It's also a good idea to know your test results and keep a list of the medicines you take. How can you care for yourself at home? · To prevent dehydration, drink plenty of fluids, enough so that your urine is light yellow or clear like water.  Choose water and other caffeine-free clear liquids until you feel better. If you have kidney, heart, or liver disease and have to limit fluids, talk with your doctor before you increase the amount of fluids you drink. · Take an over-the-counter pain medicine, such as acetaminophen (Tylenol), ibuprofen (Advil, Motrin), or naproxen (Aleve). Read and follow all instructions on the label. · Before you use cough and cold medicines, check the label. These medicines may not be safe for young children or for people with certain health problems. · Be careful when taking over-the-counter cold or flu medicines and Tylenol at the same time. Many of these medicines have acetaminophen, which is Tylenol. Read the labels to make sure that you are not taking more than the recommended dose. Too much acetaminophen (Tylenol) can be harmful. · Get plenty of rest. 
· Do not smoke or allow others to smoke around you. If you need help quitting, talk to your doctor about stop-smoking programs and medicines. These can increase your chances of quitting for good. When should you call for help? Call 911 anytime you think you may need emergency care. For example, call if: 
· You have severe trouble breathing. Call your doctor now or seek immediate medical care if: 
· You seem to be getting much sicker. · You have new or worse trouble breathing. · You have a new or higher fever. · You have a new rash. Watch closely for changes in your health, and be sure to contact your doctor if: 
· You have a new symptom, such as a sore throat, an earache, or sinus pain. · You cough more deeply or more often, especially if you notice more mucus or a change in the color of your mucus. · You do not get better as expected. Where can you learn more? Go to http://kerrie-bob.info/. Enter D587 in the search box to learn more about \"Upper Respiratory Infection (Cold): Care Instructions. \" Current as of: June 30, 2016 Content Version: 11.1 © 2523-9012 Healthwise[employer]. Care instructions adapted under license by [employer] (which disclaims liability or warranty for this information). If you have questions about a medical condition or this instruction, always ask your healthcare professional. [address] any warranty or liability for your use of this information. Introducing \[facility] [place] [facility]\"" & HEALTH SERVICES! Dear [place]: 
Thank you for requesting a [employer] account. Our records indicate that you already have an active [employer] account. You can access your account anytime at [email or web address] Did you know that you can access your hospital and ER discharge instructions at any time in [employer]? You can also review all of your test results from your hospital stay or ER visit. Additional Information If you have questions, please visit the Frequently Asked Questions section of the [employer] website at [email or web address] Remember, [employer] is NOT to be used for urgent needs. For medical emergencies, dial 911. Now available from your iPhone and Android! Please provide this summary of care documentation to your next provider. Your primary care clinician is listed as [place]. If you have any questions after today's visit, please call [phone].

## 2017-02-09 NOTE — TELEPHONE ENCOUNTER
Please call [facility] and find out how we can go about adding trulicity to her patient assistance as she is currently a patient who has been approved for humalog.

## 2017-02-10 NOTE — TELEPHONE ENCOUNTER
Please fax the prescription to the number and put on the cover sheet that patient is currently approved for Humalog and needs to have trulicity added to her profile to receive a 4 month supply.

## 2017-02-10 NOTE — TELEPHONE ENCOUNTER
Per Milagros Polanco with [facility] he stated that normally after 6 months of an application a new application would need to be submitted for an different insulin. He stated that you could try it and fax the script to [phone] with patient name and . He stated that on the cover sheet it should state. .  Please check to see if this patient can be enrolled for this medication being that she has already been approved for her Humalog. He stated that it can take up to 2-3 days for an answer. The second option is to do the whole process.

## 2017-02-26 NOTE — TELEPHONE ENCOUNTER
Can you follow up with [facility] and find out if she was approved for trulicity as I have not received a letter stating the outcome one way or another.

## 2017-02-28 NOTE — TELEPHONE ENCOUNTER
Please confirm that we have this at our office and let her know she can  the trulicity at her convenience.

## 2017-02-28 NOTE — TELEPHONE ENCOUNTER
I spoke with Daniel Joe with [employer] and she stated that Mrs. Mendez was approved and the insulin was delivered to our office today.   She stated that according to the tracking it was signed for at 12:10pm

## 2017-03-23 ENCOUNTER — TELEPHONE (OUTPATIENT)
Dept: INTERNAL MEDICINE CLINIC | Age: 62
End: 2017-03-23

## 2017-03-23 NOTE — TELEPHONE ENCOUNTER
Pt called and states that she is needing a call back in regards to getting a np/est care appt with Dr. Marcella Dodd in April. Pt states that [place] is going [place] in [place] an dis wanting to transfer care to Dr. Marcella Dodd from Dr. Libby Almanza. Please call pt to advise as the soonest Np appt was in June and she is needing it sooner.

## 2017-03-28 ENCOUNTER — OFFICE VISIT (OUTPATIENT)
Dept: FAMILY PLANNING/WOMEN'S HEALTH CLINIC | Age: 62
End: 2017-03-28

## 2017-03-28 ENCOUNTER — HOSPITAL ENCOUNTER (OUTPATIENT)
Dept: MAMMOGRAPHY | Age: 62
Discharge: HOME | End: 2017-03-28
Attending: NURSE PRACTITIONER

## 2017-03-28 VITALS — DIASTOLIC BLOOD PRESSURE: 70 MMHG | SYSTOLIC BLOOD PRESSURE: 128 MMHG

## 2017-03-28 DIAGNOSIS — Z12.31 SCREENING MAMMOGRAM, ENCOUNTER FOR: Primary | ICD-10-CM

## 2017-03-28 DIAGNOSIS — Z12.31 VISIT FOR SCREENING MAMMOGRAM: ICD-10-CM

## 2017-03-28 PROCEDURE — 77067 SCR MAMMO BI INCL CAD: CPT

## 2017-03-28 NOTE — PROGRESS NOTES
EVERY WOMANS LIFE HISTORY QUESTIONNAIRE       No Yes Comments   Has a doctor ever seen or felt anything wrong with your breast? [x]                                  []                                     Have you ever had a breast biopsy? [x]                                  []                                          When and where was last mammogram performed? 2/2016 with [facility]    Have you ever been told that there was a problem on your mammogram?   No Yes Comments   [x]                                  []                                       Do you have breast implants? No Yes Comments   [x]                                  []                                       When was your last Pap test performed? 1/2015    Have you ever had an abnormal Pap test?   No Yes Comments   [x]                                  []                                       Have you had a hysterectomy? No Yes Comments (why)   [x]                                  []                                       Have you ever been diagnosed with any type of Cancer   No Yes Comments (type,when,where,type of treatment   [x]                                  []                                          Has a family member been diagnosed with breast or ovarian cancer? No Yes Comments (which family members, and type   [x]                                  []                                       Did your mother take TERE? No Yes Unknown   [x]                                  []                                       Do you have a history of HIV exposure? No Yes    [x]                                  []                                         Have you been through menopause?    No Yes Date of LMP   []                                  [x]                                  At least 10 yrs ago     Are you taking hormone replacement therapy (HRT)     No Yes Comments   [x]                                  []                                       How many times have you been pregnant? 1       Number of live births ? 1    Are you experiencing any of the following? No Yes Comments   Nipple Discharge [x]                                  []                                     Breast Lump/Masses [x]                                  []                                     Breast Skin Changes [x]                                  []                                          No Yes Comments   Vaginal Discharge [x]                                  []                                     Abnormal/unusual vaginal bleeding [x]                                  []                                         Are you experiencing any other health problems?     Diabetes, urinary frequency---has new PCP Vanessa Thompson---recently approved for the Care Card

## 2017-04-06 ENCOUNTER — OFFICE VISIT (OUTPATIENT)
Dept: INTERNAL MEDICINE CLINIC | Age: 62
End: 2017-04-06

## 2017-04-06 ENCOUNTER — TELEPHONE (OUTPATIENT)
Dept: INTERNAL MEDICINE CLINIC | Age: 62
End: 2017-04-06

## 2017-04-06 VITALS
SYSTOLIC BLOOD PRESSURE: 108 MMHG | WEIGHT: 153 LBS | HEIGHT: 61 IN | DIASTOLIC BLOOD PRESSURE: 68 MMHG | OXYGEN SATURATION: 98 % | HEART RATE: 86 BPM | BODY MASS INDEX: 28.89 KG/M2 | RESPIRATION RATE: 19 BRPM | TEMPERATURE: 97.9 F

## 2017-04-06 DIAGNOSIS — R35.0 URINARY FREQUENCY: Primary | ICD-10-CM

## 2017-04-06 DIAGNOSIS — N30.00 ACUTE CYSTITIS WITHOUT HEMATURIA: ICD-10-CM

## 2017-04-06 DIAGNOSIS — L29.9 ITCHING: ICD-10-CM

## 2017-04-06 LAB
BILIRUB UR QL STRIP: NEGATIVE
GLUCOSE UR-MCNC: NEGATIVE MG/DL
KETONES P FAST UR STRIP-MCNC: NEGATIVE MG/DL
PH UR STRIP: 5.5 [PH] (ref 4.6–8)
PROT UR QL STRIP: NEGATIVE MG/DL
SP GR UR STRIP: 1.02 (ref 1–1.03)
UA UROBILINOGEN AMB POC: NORMAL (ref 0.2–1)
URINALYSIS CLARITY POC: CLEAR
URINALYSIS COLOR POC: YELLOW
URINE BLOOD POC: NORMAL
URINE LEUKOCYTES POC: NORMAL
URINE NITRITES POC: POSITIVE

## 2017-04-06 RX ORDER — LEVOFLOXACIN 500 MG/1
500 TABLET, FILM COATED ORAL DAILY
Qty: 7 TAB | Refills: 0 | Status: SHIPPED | OUTPATIENT
Start: 2017-04-06 | End: 2017-05-11

## 2017-04-06 NOTE — PROGRESS NOTES
HPI: [place]o is a 64 y.o. female presents for follow up. Seen at urgent care in February with strep throat. Reports ongoing cough, productive of green mucous. Notes chest congestion. Fatigue. No ULRICH. Has urinary frequency for the past 2 weeks. No urgency, no dysuria. Saw gyn last week for urinary frequency. No urine test done that day. Distant prior UTI. Complaints of feeling itchy all over for the past month. Reviewed labs from January 23. Sees Dr. Kee Archuleta for diabetes. HbA1C 7.9% in January. Taking Lantus, Humalog, metformin and started on Trulicity. Treated for HLP. No myalgias on statin and lopid. Reports constipation, taking senna 2 a day.        ROS:  Constitutional: negative for fevers, chills, anorexia, weight loss, positive for fatigue  Eyes:   negative for visual disturbance, irritation  ENT:   negative for tinnitus,sore throat,nasal congestion,ear pain  Respiratory:  negative for  dyspnea,wheezing,positive for cough  CV:   negative for chest pain, palpitations, lower extremity edema  GI:   negative for nausea, vomiting, diarrhea, abdominal pain,melena  Endo:               negative for polyuria,polydipsia,polyphagia,heat intolerance  Genitourinary: negative for dysuria, hematuria,positive for urinary frequency  Neurological:  negative for headaches, dizziness, gait problems, numbness  Behavl/Psych: negative for feelings of anxiety, depression, mood changes    Past Medical History:   Diagnosis Date    Chest pain 2012    Saw Dr. Juvenal Schaumann, negative stress test    Diabetes [facility])     Diverticulosis     seen on CT scan    Hyperlipidemia     Hypertension     Obesity     Sensory neuropathy 2012    Tingling L toe    Tendonitis, Achilles, left 2012    Type II or unspecified type diabetes mellitus without mention of complication, uncontrolled     Vitamin D deficiency      Past Surgical History:   Procedure Laterality Date    TYMPANOPLASTY      left     Social History Social History    Marital status:      Spouse name: N/A    Number of children: N/A    Years of education: N/A     Social History Main Topics    Smoking status: Former Smoker     Packs/day: 0.50     Years: 20.00     Quit date: 11/3/2004    Smokeless tobacco: Never Used    Alcohol use No    Drug use: No    Sexual activity: Yes     Partners: Male     Other Topics Concern    None     Social History Narrative    Lives in [place] with  of 10 years. Has a 33 yo daughter from a previous marriage. Used to work as a  for [employer] a [employer] in [place] and for [employer]. Likes to read, cook, and shop. Family History   Problem Relation Age of Onset    Diabetes Father     Heart Disease Father 46     MI    Diabetes Brother     Diabetes Other      multiple family members on father's side    Diabetes Brother     Diabetes Brother     Stroke Neg Hx      Current Outpatient Prescriptions   Medication Sig Dispense Refill    levoFLOXacin (LEVAQUIN) 500 mg tablet Take 1 Tab by mouth daily. 7 Tab 0    dulaglutide (TRULICITY) 3.64 CU/4.2 mL sub-q pen 0.5 mL by SubCUTAneous route every seven (7) days. 16 Syringe 3    cholecalciferol (VITAMIN D3) 1,000 unit tablet Take  by mouth daily.  metFORMIN (GLUCOPHAGE) 1,000 mg tablet TAKE ONE TABLET BY MOUTH TWICE DAILY WITH MEALS 180 Tab 3    gemfibrozil (LOPID) 600 mg tablet TAKE ONE TABLET BY MOUTH TWICE DAILY 180 Tab 3    lisinopril (PRINIVIL, ZESTRIL) 20 mg tablet TAKE ONE TABLET BY MOUTH ONCE DAILY.  90 Tab 3    pravastatin (PRAVACHOL) 40 mg tablet TAKE 1/2 TABLET BY MOUTH ONCE DAILY--Dose change 1/23/17--updated med list--did not send prescription to the pharmacy 90 Tab 3    phentermine (ADIPEX-P) 37.5 mg tablet Take 1 tablet before breakfast 100 Tab 1    FREESTYLE LITE METER monitoring kit Test 4 times daily--Dx: E11.65 1 Kit 0    insulin glargine (LANTUS SOLOSTAR) 100 unit/mL (3 mL) pen 40 Units by SubCUTAneous route daily. Dose change 2/16/17--updated med list--did not send prescription to the pharmacy      insulin lispro (HUMALOG KWIKPEN) 100 unit/mL kwikpen by SubCUTAneous route. 3 U PER 15 G OF CARBS 1 Package 3    coenzyme q10 (CO Q-10) 100 mg Cap Take 100 mg by mouth daily.  aspirin 81 mg tablet Take 81 mg by mouth daily.       FREESTYLE LITE STRIPS strip Test 4 times daily--Dx: E11.65--[place] 400 Strip 3     Allergies   Allergen Reactions    Carrot Swelling    Apple Swelling     Apple skin causes her lips to swell    Simvastatin Myalgia         Physical exam:  Visit Vitals    /68 (BP 1 Location: Left arm, BP Patient Position: Sitting)    Pulse 86    Temp 97.9 °F (36.6 °C) (Oral)    Resp 19    Ht 5' 1\" (1.549 m)    Wt 153 lb (69.4 kg)    LMP 06/28/2010    SpO2 98%    BMI 28.91 kg/m2     General appearance - alert, well appearing, and in no distress  HEENT- PERLL,normal conjunctiva, TM normal bilaterally,  mucous membranes moist, pharynx normal without lesions  Neck - supple, no significant adenopathy   Pulm- clear to auscultation, no wheezes, rales or rhonchi  CV- normal rate, regular rhythm, normal S1, S2, no murmurs   Abdomen - soft, nontender, no CVAT      Results for orders placed or performed in visit on 04/06/17   CULTURE, URINE   Result Value Ref Range    Urine Culture, Routine (A)      Escherichia coli  Greater than 100,000 colony forming units per mL      Urine Culture, Routine (A)      Escherichia coli  Greater than 100,000 colony forming units per mL         Susceptibility    Escherichia coli -  (no method available)*     Amoxicillin/Clavulanic A R Resistant      Ampicillin ($) I Intermediate      Cefazolin ($) R Resistant      Cefepime ($$) S Susceptible      Ceftriaxone ($) S Susceptible      Cefuroxime ($) R Resistant      Cephalothin R Resistant      Ciprofloxacin ($) S Susceptible      Ertapenem ($$$$) S Susceptible      Gentamicin ($) S Susceptible      Imipenem S Susceptible      Levofloxacin ($) S Susceptible      Nitrofurantoin S Susceptible      Piperacillin S Susceptible      Tetracycline R Resistant      Tobramycin ($) S Susceptible      Trimeth-Sulfamethoxa S Susceptible     Escherichia coli -  (no method available)*     Amoxicillin/Clavulanic A R Resistant      Ampicillin ($) I Intermediate      Cefazolin ($) R Resistant      Cefepime ($$) S Susceptible      Ceftriaxone ($) S Susceptible      Cefuroxime ($) R Resistant      Cephalothin R Resistant      Ciprofloxacin ($) R Resistant      Ertapenem ($$$$) S Susceptible      Gentamicin ($) S Susceptible      Imipenem S Susceptible      Levofloxacin ($) R Resistant      Nitrofurantoin S Susceptible      Piperacillin S Susceptible      Tetracycline R Resistant      Tobramycin ($) S Susceptible      Trimeth-Sulfamethoxa S Susceptible      * Performed at:  [address] [place] [address]  [id number] Director: Africa Lema MD, Phone:  [phone]     Performed at:  [address] [place] [address]  [id number] Director: Africa Lema MD, Phone:  [phone]   METABOLIC PANEL, BASIC   Result Value Ref Range    Glucose 120 (H) 65 - 99 mg/dL    BUN 13 8 - 27 mg/dL    Creatinine 0.58 0.57 - 1.00 mg/dL    GFR est non- >59 mL/min/1.73    GFR est  >59 mL/min/1.73    BUN/Creatinine ratio 22 12 - 28    Sodium 141 134 - 144 mmol/L    Potassium 3.9 3.5 - 5.2 mmol/L    Chloride 100 96 - 106 mmol/L    CO2 23 18 - 29 mmol/L    Calcium 9.8 8.7 - 10.3 mg/dL   AMB POC URINALYSIS DIP STICK AUTO W/ MICRO   Result Value Ref Range    Color (UA POC) Yellow     Clarity (UA POC) Clear     Glucose (UA POC) Negative Negative    Bilirubin (UA POC) Negative Negative    Ketones (UA POC) Negative Negative    Specific gravity (UA POC) 1.020 1.001 - 1.035    Blood (UA POC) Trace Negative    pH (UA POC) 5.5 4.6 - 8.0    Protein (UA POC) Negative Negative mg/dL    Urobilinogen (UA POC) 0.2 mg/dL 0.2 - 1    Nitrites (UA POC) Positive Negative    Leukocyte esterase (UA POC) 1+ Negative       Assessment/Plan:    1. Urinary frequency    - AMB POC URINALYSIS DIP STICK AUTO W/ MICRO  - METABOLIC PANEL, BASIC  - levoFLOXacin (LEVAQUIN) 500 mg tablet; Take 1 Tab by mouth daily. Dispense: 7 Tab; Refill: 0    2. Acute cystitis without hematuria    - CULTURE, URINE    3. Itching    - METABOLIC PANEL, BASIC    4. Uncontrolled type 2 diabetes mellitus with complication, with long-term current use of insulin ([facility] [address]- Recommend compliance with diabetic diet. Continue medications for diabetes. Monitor blood sugar readings as discussed. Keep up on regular diabetic eye exams. Follow-up Disposition:  Return for follow up pending labs.   Virginia Spaulding MD

## 2017-04-06 NOTE — MR AVS SNAPSHOT
Visit Information Date & Time Provider Department Dept. Phone Encounter #  
 4/6/2017 11:00 AM Ramon Escobar, [address] [phone] [id number] Follow-up Instructions Return for follow up pending labs. .  
  
Your Appointments 6/23/2017 10:30 AM  
Follow Up with MD Scotty Ngo [facility] Appt Note: 5 month f/u     Diabetes [address] [id number] [address] [facility] Upcoming Health Maintenance Date Due Hepatitis C Screening 1955 COLONOSCOPY 5/1/1973 Pneumococcal 19-64 Medium Risk (1 of 1 - PPSV23) 5/1/1974 DTaP/Tdap/Td series (1 - Tdap) 5/1/1976 ZOSTER VACCINE AGE 60> 5/1/2015 INFLUENZA AGE 9 TO ADULT 8/1/2016 EYE EXAM RETINAL OR DILATED Q1 3/31/2017 FOOT EXAM Q1 5/9/2017 MICROALBUMIN Q1 5/9/2017 HEMOGLOBIN A1C Q6M 7/23/2017 PAP AKA CERVICAL CYTOLOGY 1/10/2018 LIPID PANEL Q1 1/23/2018 BREAST CANCER SCRN MAMMOGRAM 3/28/2019 Allergies as of 4/6/2017  Review Complete On: 4/6/2017 By: Ramon Escobar MD  
  
 Severity Noted Reaction Type Reactions Carrot Medium 11/03/2010   Systemic Swelling Apple  02/01/2017    Swelling Apple skin causes her lips to swell Simvastatin  07/22/2011    Myalgia Current Immunizations  Reviewed on 11/24/2014 No immunizations on file. Not reviewed this visit You Were Diagnosed With   
  
 Codes Comments Urinary frequency    -  Primary ICD-10-CM: R35.0 ICD-9-CM: 788.41 Acute cystitis without hematuria     ICD-10-CM: N30.00 ICD-9-CM: 595.0 Itching     ICD-10-CM: L29.9 ICD-9-CM: 698.9 Uncontrolled type 2 diabetes mellitus with complication, with long-term current use of insulin (HCC)     ICD-10-CM: E11.8, E11.65, Z79.4 ICD-9-CM: 250.82, V58.67 Vitals BP Pulse Temp Resp Height(growth percentile) Weight(growth percentile) 108/68 (BP 1 Location: Left arm, BP Patient Position: Sitting) 86 97.9 °F (36.6 °C) (Oral) 19 5' 1\" (1.549 m) 153 lb (69.4 kg) LMP SpO2 BMI OB Status Smoking Status 06/28/2010 98% 28.91 kg/m2 Postmenopausal Former Smoker Vitals History BMI and BSA Data Body Mass Index Body Surface Area  
 28.91 kg/m 2 1.73 m 2 Preferred Pharmacy Pharmacy Name Phone [facility] [address] [place] [phone] Your Updated Medication List  
  
   
This list is accurate as of: 4/6/17 12:04 PM.  Always use your most recent med list.  
  
  
  
  
 aspirin 81 mg tablet Take 81 mg by mouth daily. CO Q-10 100 mg capsule Generic drug:  co-enzyme Q-10 Take 100 mg by mouth daily. dulaglutide 0.75 mg/0.5 mL sub-q pen Commonly known as:  TRULICITY  
0.5 mL by SubCUTAneous route every seven (7) days. FREESTYLE LITE METER monitoring kit Generic drug:  Blood-Glucose Meter Test 4 times daily--Dx: E11.65 FREESTYLE LITE STRIPS strip Generic drug:  glucose blood VI test strips Test 4 times daily--Dx: E11.65--90 [place] supply [place]  
  
 gemfibrozil 600 mg tablet Commonly known as:  LOPID TAKE ONE TABLET BY MOUTH TWICE DAILY  
  
 insulin glargine 100 unit/mL (3 mL) pen Commonly known as:  LANTUS SOLOSTAR  
40 Units by SubCUTAneous route daily. Dose change 2/16/17--updated med list--did not send prescription to the pharmacy  
  
 insulin lispro 100 unit/mL kwikpen Commonly known as:  HumaLOG KwikPen  
by SubCUTAneous route. 3 U PER 15 G OF CARBS  
  
 levoFLOXacin 500 mg tablet Commonly known as:  Dewitte Haver Take 1 Tab by mouth daily. lisinopril 20 mg tablet Commonly known as:  PRINIVIL, ZESTRIL  
TAKE ONE TABLET BY MOUTH ONCE DAILY. metFORMIN 1,000 mg tablet Commonly known as:  GLUCOPHAGE  
 TAKE ONE TABLET BY MOUTH TWICE DAILY WITH MEALS  
  
 phentermine 37.5 mg tablet Commonly known as:  ADIPEX-P Take 1 tablet before breakfast  
  
 pravastatin 40 mg tablet Commonly known as:  PRAVACHOL  
TAKE 1/2 TABLET BY MOUTH ONCE DAILY--Dose change 1/23/17--updated med list--did not send prescription to the pharmacy VITAMIN D3 1,000 unit tablet Generic drug:  cholecalciferol Take  by mouth daily. Prescriptions Sent to Pharmacy Refills  
 levoFLOXacin (LEVAQUIN) 500 mg tablet 0 Sig: Take 1 Tab by mouth daily. Class: Normal  
 Pharmacy: [address] [place], [address] [place] Ph #: [phone] Route: Oral  
  
We Performed the Following AMB POC URINALYSIS DIP STICK AUTO W/ MICRO [08060 CPT(R)] CULTURE, URINE [id number] CPT(R)] METABOLIC PANEL, BASIC [28495 CPT(R)] Follow-up Instructions Return for follow up pending labs. .  
  
  
Patient Instructions FIBER SUPPLEMENTS, TRY TO GET 30 GRAMS OF FIBER A DAY. 6-8 GLASSES OF WATER A DAY FOR CONSTIPATION. FOR HAIR LOSS, ADD BIOTIN OVER THE COUNTER SUPPLEMENT. Introducing [facility]! Dear [place]: 
Thank you for requesting a [employer] account. Our records indicate that you already have an active [employer] account. You can access your account anytime at [email or web address] Did you know that you can access your hospital and ER discharge instructions at any time in [employer]? You can also review all of your test results from your hospital stay or ER visit. Additional Information If you have questions, please visit the Frequently Asked Questions section of the [employer] website at [email or web address] Remember, [employer] is NOT to be used for urgent needs. For medical emergencies, dial 911. Now available from your iPhone and Android! Please provide this summary of care documentation to your next provider. Your primary care clinician is listed as Valeta Ask. If you have any questions after today's visit, please call [phone].

## 2017-04-06 NOTE — TELEPHONE ENCOUNTER
Called and spoke with patient abx called into [facility] on [phone] spoke with Lexus Serrato cancelled the electronic order on [facility] at [place] spoke with [facility]

## 2017-04-06 NOTE — PROGRESS NOTES
Reviewed record in preparation for visit and have obtained necessary documentation. Identified pt with two pt identifiers(name and ). Chief Complaint   Patient presents with   [facility] Establish Care         Coordination of Care Questionnaire:  :     1) Have you been to an emergency room, urgent care clinic since your last visit? yes Urgent Care   Hospitalized since your last visit? no             2) Have you seen or consulted any other health care providers outside of [address] [place] [address] since your last visit? no  (Include any pap smears or colon screenings in this section.)      Patient is accompanied by  I have received verbal consent from [place] to discuss any/all medical information while they are present in the room.

## 2017-04-06 NOTE — PATIENT INSTRUCTIONS
FIBER SUPPLEMENTS, TRY TO GET 30 GRAMS OF FIBER A DAY. 6-8 GLASSES OF WATER A DAY FOR CONSTIPATION. FOR HAIR LOSS, ADD BIOTIN OVER THE COUNTER SUPPLEMENT.

## 2017-04-06 NOTE — TELEPHONE ENCOUNTER
Pt states she would like to have script from today sent to [facility] in [place]

## 2017-04-07 LAB
BUN SERPL-MCNC: 13 MG/DL (ref 8–27)
BUN/CREAT SERPL: 22 (ref 12–28)
CALCIUM SERPL-MCNC: 9.8 MG/DL (ref 8.7–10.3)
CHLORIDE SERPL-SCNC: 100 MMOL/L (ref 96–106)
CO2 SERPL-SCNC: 23 MMOL/L (ref 18–29)
CREAT SERPL-MCNC: 0.58 MG/DL (ref 0.57–1)
GLUCOSE SERPL-MCNC: 120 MG/DL (ref 65–99)
POTASSIUM SERPL-SCNC: 3.9 MMOL/L (ref 3.5–5.2)
SODIUM SERPL-SCNC: 141 MMOL/L (ref 134–144)

## 2017-04-07 NOTE — PROGRESS NOTES
Advise patient that her kidney function is normal, so the trulicity is not causing any kidney issues. She can stop it for a week to see if the itching goes away. If it does, we would change the medication. If it does not, it is not the medication and she could resume it.

## 2017-04-08 RX ORDER — BLOOD-GLUCOSE METER
KIT MISCELLANEOUS
Qty: 400 STRIP | Refills: 3 | Status: SHIPPED | OUTPATIENT
Start: 2017-04-08 | End: 2018-03-05 | Stop reason: SDUPTHER

## 2017-04-09 LAB
BACTERIA UR CULT: ABNORMAL
BACTERIA UR CULT: ABNORMAL

## 2017-04-11 ENCOUNTER — TELEPHONE (OUTPATIENT)
Dept: INTERNAL MEDICINE CLINIC | Age: 62
End: 2017-04-11

## 2017-04-11 NOTE — TELEPHONE ENCOUNTER
Urine culture shows she does have a UTI. She is on the right antibiotic and should be getting better.  She is to call if symptoms persist.

## 2017-04-24 ENCOUNTER — TELEPHONE (OUTPATIENT)
Dept: ENDOCRINOLOGY | Age: 62
End: 2017-04-24

## 2017-04-24 NOTE — TELEPHONE ENCOUNTER
Please submit a refill request for her lantus through [facility] Patient assistance.

## 2017-04-25 ENCOUNTER — TELEPHONE (OUTPATIENT)
Dept: INTERNAL MEDICINE CLINIC | Age: 62
End: 2017-04-25

## 2017-04-25 NOTE — TELEPHONE ENCOUNTER
----- Message from [place] sent at 4/23/2017 10:09 PM EDT -----  Regarding: RE:MyChart After Visit Follow-Up Message. Contact: [phone]  Aguilar Collier,  Per your instructions, I did not use Trulicity for one(1) week and I have taken OTC Antihistamine daily to help with the \"Itching\" allover my body. However, I do not know if my UTI has cleared or not because I still feel the dull pain on my right side. I took Jacelyn Cordova as prescribed. Will I be tested again? Please advise. Thank you very much Dr. Theo Collier.  ----- Message -----  From: Yossi Boyd MD  Sent: 4/20/2017 12:00 AM EDT  To: [place] Alfredo  Subject: MyChart After Visit Follow-Up Message. Aguilar MckeonSterling Mendez,    Thank you for your recent visit to [facility]. Please take a moment to complete a brief survey regarding your visit.       Sincerely,    [facility]

## 2017-04-26 NOTE — TELEPHONE ENCOUNTER
[employer] [facility] message sent in response with Dr. Erica Rendon recommendation to come in for a nurse visit to recheck urine.

## 2017-05-08 RX ORDER — PHENTERMINE HYDROCHLORIDE 37.5 MG/1
TABLET ORAL
Qty: 100 TAB | Refills: 1 | Status: SHIPPED | OUTPATIENT
Start: 2017-05-08 | End: 2017-07-24 | Stop reason: SDUPTHER

## 2017-05-09 ENCOUNTER — TELEPHONE (OUTPATIENT)
Dept: ENDOCRINOLOGY | Age: 62
End: 2017-05-09

## 2017-05-09 ENCOUNTER — TELEPHONE (OUTPATIENT)
Dept: INTERNAL MEDICINE CLINIC | Age: 62
End: 2017-05-09

## 2017-05-09 NOTE — TELEPHONE ENCOUNTER
----- Message from Gail Rowan LPN sent at 2/9/5910  2:02 PM EDT -----  Regarding: PAP  Humalog pens came in from [facility] PAP today. In fridge for .

## 2017-05-09 NOTE — TELEPHONE ENCOUNTER
Patient states she needs a call back to discuss getting just a urinalysis done. Please call.  Thank you

## 2017-05-11 ENCOUNTER — CLINICAL SUPPORT (OUTPATIENT)
Dept: INTERNAL MEDICINE CLINIC | Age: 62
End: 2017-05-11

## 2017-05-11 DIAGNOSIS — R35.0 URINARY FREQUENCY: Primary | ICD-10-CM

## 2017-05-11 LAB
BILIRUB UR QL STRIP: NEGATIVE
GLUCOSE UR-MCNC: NEGATIVE MG/DL
KETONES P FAST UR STRIP-MCNC: NEGATIVE MG/DL
PH UR STRIP: 5.5 [PH] (ref 4.6–8)
PROT UR QL STRIP: NEGATIVE MG/DL
SP GR UR STRIP: 1.01 (ref 1–1.03)
UA UROBILINOGEN AMB POC: NORMAL (ref 0.2–1)
URINALYSIS CLARITY POC: CLEAR
URINALYSIS COLOR POC: YELLOW
URINE BLOOD POC: NORMAL
URINE LEUKOCYTES POC: NORMAL
URINE NITRITES POC: NEGATIVE

## 2017-05-11 RX ORDER — CIPROFLOXACIN 500 MG/1
500 TABLET ORAL 2 TIMES DAILY
Qty: 14 TAB | Refills: 0 | Status: SHIPPED | OUTPATIENT
Start: 2017-05-11 | End: 2017-06-23

## 2017-05-11 NOTE — PROGRESS NOTES
Patient came in for nurse visit. Urinary frequency. Urinalysis suggests UTI. Urine culture sent. rx cipro 500mg BID for 7 days.

## 2017-05-12 LAB — BACTERIA UR CULT: NO GROWTH

## 2017-06-08 ENCOUNTER — TELEPHONE (OUTPATIENT)
Dept: ENDOCRINOLOGY | Age: 62
End: 2017-06-08

## 2017-06-08 NOTE — TELEPHONE ENCOUNTER
Patient was notified that her Trulicity through patient assistance arrived. She stated that she would pick it up on Monday 6/12/2017\.

## 2017-06-11 RX ORDER — INSULIN LISPRO 100 [IU]/ML
INJECTION, SOLUTION INTRAVENOUS; SUBCUTANEOUS
Qty: 60 ML | Refills: 3 | Status: SHIPPED | OUTPATIENT
Start: 2017-06-11 | End: 2017-06-12 | Stop reason: SDUPTHER

## 2017-06-12 RX ORDER — INSULIN LISPRO 100 [IU]/ML
INJECTION, SOLUTION INTRAVENOUS; SUBCUTANEOUS
Qty: 60 ML | Refills: 3 | Status: SHIPPED | OUTPATIENT
Start: 2017-06-12 | End: 2017-06-23 | Stop reason: SDUPTHER

## 2017-06-20 ENCOUNTER — TELEPHONE (OUTPATIENT)
Dept: INTERNAL MEDICINE CLINIC | Age: 62
End: 2017-06-20

## 2017-06-20 NOTE — TELEPHONE ENCOUNTER
Patient states she would like to get an appt to be seen this Friday with Dr. Jeffry Torres as she will be in town & is still having persistent Right Side pain. Please call to discuss.  Thank you

## 2017-06-23 ENCOUNTER — OFFICE VISIT (OUTPATIENT)
Dept: INTERNAL MEDICINE CLINIC | Age: 62
End: 2017-06-23

## 2017-06-23 ENCOUNTER — OFFICE VISIT (OUTPATIENT)
Dept: ENDOCRINOLOGY | Age: 62
End: 2017-06-23

## 2017-06-23 VITALS
DIASTOLIC BLOOD PRESSURE: 72 MMHG | HEIGHT: 61 IN | TEMPERATURE: 98.2 F | SYSTOLIC BLOOD PRESSURE: 129 MMHG | RESPIRATION RATE: 18 BRPM | OXYGEN SATURATION: 99 % | HEART RATE: 85 BPM | WEIGHT: 153.4 LBS | BODY MASS INDEX: 28.96 KG/M2

## 2017-06-23 VITALS
DIASTOLIC BLOOD PRESSURE: 65 MMHG | HEART RATE: 87 BPM | BODY MASS INDEX: 28.77 KG/M2 | HEIGHT: 61 IN | WEIGHT: 152.4 LBS | SYSTOLIC BLOOD PRESSURE: 121 MMHG

## 2017-06-23 DIAGNOSIS — E83.52 HYPERCALCEMIA: ICD-10-CM

## 2017-06-23 DIAGNOSIS — R35.0 INCREASED URINARY FREQUENCY: ICD-10-CM

## 2017-06-23 DIAGNOSIS — E66.3 OVERWEIGHT: ICD-10-CM

## 2017-06-23 DIAGNOSIS — E55.9 VITAMIN D DEFICIENCY: ICD-10-CM

## 2017-06-23 DIAGNOSIS — E78.5 HYPERLIPIDEMIA LDL GOAL <100: ICD-10-CM

## 2017-06-23 DIAGNOSIS — R10.9 FLANK PAIN: Primary | ICD-10-CM

## 2017-06-23 DIAGNOSIS — I10 ESSENTIAL HYPERTENSION, BENIGN: ICD-10-CM

## 2017-06-23 DIAGNOSIS — Z12.11 SCREENING FOR COLON CANCER: ICD-10-CM

## 2017-06-23 LAB
BILIRUB UR QL STRIP: NEGATIVE
GLUCOSE UR-MCNC: NEGATIVE MG/DL
HBA1C MFR BLD HPLC: 6.6 %
KETONES P FAST UR STRIP-MCNC: NEGATIVE MG/DL
PH UR STRIP: 5.5 [PH] (ref 4.6–8)
PROT UR QL STRIP: NEGATIVE MG/DL
SP GR UR STRIP: 1.03 (ref 1–1.03)
UA UROBILINOGEN AMB POC: NORMAL (ref 0.2–1)
URINALYSIS CLARITY POC: CLEAR
URINALYSIS COLOR POC: YELLOW
URINE BLOOD POC: NEGATIVE
URINE LEUKOCYTES POC: NORMAL
URINE NITRITES POC: NEGATIVE

## 2017-06-23 RX ORDER — INSULIN GLARGINE 100 [IU]/ML
38-42 INJECTION, SOLUTION SUBCUTANEOUS
COMMUNITY
Start: 2017-06-23 | End: 2019-02-07 | Stop reason: CLARIF

## 2017-06-23 RX ORDER — MULTIVIT WITH MINERALS/HERBS
1 TABLET ORAL DAILY
COMMUNITY

## 2017-06-23 RX ORDER — GLUCOSAMINE/CHONDR SU A SOD 750-600 MG
TABLET ORAL DAILY
COMMUNITY
End: 2018-02-22

## 2017-06-23 RX ORDER — INSULIN LISPRO 100 [IU]/ML
INJECTION, SOLUTION INTRAVENOUS; SUBCUTANEOUS
Qty: 60 ML | Refills: 3
Start: 2017-06-23 | End: 2019-07-01 | Stop reason: SDUPTHER

## 2017-06-23 NOTE — MR AVS SNAPSHOT
Visit Information Date & Time Provider Department Dept. Phone Encounter #  
 6/23/2017 10:30 AM Selin Ramos, [address] [facility] Diabetes and Endocrinology [phone] Follow-up Instructions Return in about 5 months (around 11/23/2017). Your Appointments 6/23/2017  1:00 PM  
ACUTE CARE with Oniel Rodriguez MD  
[facility] Appt Note: R sided pain radiating to flank [facility] [address] [address]  
[phone]  
  
   
 [facility] [facility] [address]  [address] [facility]  
  
    
 7/25/2017 11:00 AM  
ROUTINE CARE with Nemo Lee MD  
[facility] Appt Note: Persistent Right Side into back pain//  
 [address] [facility] [address] [address]  
[address] [facility]  [address] [facility] Upcoming Health Maintenance Date Due Hepatitis C Screening 1955 COLONOSCOPY 5/1/1973 Pneumococcal 19-64 Medium Risk (1 of 1 - PPSV23) 5/1/1974 DTaP/Tdap/Td series (1 - Tdap) 5/1/1976 ZOSTER VACCINE AGE 60> 5/1/2015 FOOT EXAM Q1 5/9/2017 MICROALBUMIN Q1 5/9/2017 HEMOGLOBIN A1C Q6M 7/23/2017 INFLUENZA AGE 9 TO ADULT 8/1/2017 PAP AKA CERVICAL CYTOLOGY 1/10/2018 LIPID PANEL Q1 1/23/2018 EYE EXAM RETINAL OR DILATED Q1 4/24/2018 BREAST CANCER SCRN MAMMOGRAM 3/28/2019 Allergies as of 6/23/2017  Review Complete On: 6/23/2017 By: Kelle Gibbons LPN Severity Noted Reaction Type Reactions Carrot Medium 11/03/2010   Systemic Swelling Apple  02/01/2017    Swelling Apple skin causes her lips to swell Simvastatin  07/22/2011    Myalgia Current Immunizations  Reviewed on 11/24/2014 No immunizations on file. Not reviewed this visit You Were Diagnosed With   
  
 Codes Comments  Uncontrolled type 2 diabetes mellitus with complication, with long-term current use of insulin ([facility]    -  Primary ICD-10-CM: E11.8, E11.65, Z79.4 ICD-9-CM: 250.82, V58.67 Essential hypertension, benign     ICD-10-CM: I10 
ICD-9-CM: 401.1 Hyperlipidemia LDL goal <100     ICD-10-CM: E78.5 ICD-9-CM: 272.4 Vitamin D deficiency     ICD-10-CM: E55.9 ICD-9-CM: 268.9 Hypercalcemia     ICD-10-CM: C04.47 
ICD-9-CM: 275.42 Overweight     ICD-10-CM: A17.0 ICD-9-CM: 278.02 Vitals BP Pulse Height(growth percentile) Weight(growth percentile) LMP BMI  
 121/65 87 5' 1\" (1.549 m) 152 lb 6.4 oz (69.1 kg) 06/28/2010 28.8 kg/m2 OB Status Smoking Status Postmenopausal Former Smoker Vitals History BMI and BSA Data Body Mass Index Body Surface Area  
 28.8 kg/m 2 1.72 m 2 Preferred Pharmacy Pharmacy Name [facility] [address] [phone] Your Updated Medication List  
  
   
This list is accurate as of: 6/23/17 11:47 AM.  Always use your most recent med list.  
  
  
  
  
 aspirin 81 mg tablet Take 81 mg by mouth daily. b complex vitamins tablet Take 1 Tab by mouth daily. Biotin 2,500 mcg Cap Take  by mouth daily. CO Q-10 100 mg capsule Generic drug:  co-enzyme Q-10 Take 100 mg by mouth daily. dulaglutide 0.75 mg/0.5 mL sub-q pen Commonly known as:  TRULICITY  
0.5 mL by SubCUTAneous route every seven (7) days. FREESTYLE LITE METER monitoring kit Generic drug:  Blood-Glucose Meter Test 4 times daily--Dx: E11.65 FREESTYLE LITE STRIPS strip Generic drug:  glucose blood VI test strips Test 4 times daily--Dx: E11.65--[place]  
  
 gemfibrozil 600 mg tablet Commonly known as:  LOPID TAKE ONE TABLET BY MOUTH TWICE DAILY  
  
 insulin glargine 100 unit/mL (3 mL) Inpn Commonly known as:  LANTERESSA FINN  
35-40 Units by SubCUTAneous route nightly.  Dose change 6/22/17--updated med list--did not send prescription to the pharmacy  
  
 insulin lispro 100 unit/mL kwikpen Commonly known as:  HumaLOG KwikPen 4 units with breakfast and 6 units with lunch and dinner. Max 45 units per day--Dose change 6/22/17--updated med list--did not send prescription to the pharmacy  
  
 lisinopril 20 mg tablet Commonly known as:  PRINIVIL, ZESTRIL  
TAKE ONE TABLET BY MOUTH ONCE DAILY. metFORMIN 1,000 mg tablet Commonly known as:  GLUCOPHAGE  
TAKE ONE TABLET BY MOUTH TWICE DAILY WITH MEALS  
  
 phentermine 37.5 mg tablet Commonly known as:  ADIPEX-P Take 1 tablet before breakfast  
  
 pravastatin 40 mg tablet Commonly known as:  PRAVACHOL  
TAKE 1/2 TABLET BY MOUTH ONCE DAILY--Dose change 1/23/17--updated med list--did not send prescription to the pharmacy VITAMIN D3 1,000 unit tablet Generic drug:  cholecalciferol Take  by mouth daily. We Performed the Following LIPID PANEL [60677 CPT(R)] METABOLIC PANEL, COMPREHENSIVE [33876 CPT(R)] MICROALBUMIN, UR, RAND W/ MICROALBUMIN/CREA RATIO [id number] CPT(R)] WY COLLECTION VENOUS BLOOD,VENIPUNCTURE L3225127 CPT(R)] WY HANDLG&/OR CONVEY OF SPEC FOR TR OFFICE TO LAB [16141 CPT(R)] VITAMIN D, 25 HYDROXY [id number] CPT(R)] Follow-up Instructions Return in about 5 months (around 11/23/2017). Patient Instructions 1) Your Hemoglobin A1c (3 month test of blood sugar) is excellent at 6.6%. Keep up the good work. 2)  I will send you a message through Waspit with your lab results. 3) Keep your current doses of lantus and humalog the same. Introducing [facility] & [facility]! Dear [place]: 
Thank you for requesting a Waspit account. Our records indicate that you already have an active Waspit account. You can access your account anytime at [email or web address] Did you know that you can access your hospital and ER discharge instructions at any time in Shoutlet? You can also review all of your test results from your hospital stay or ER visit. Additional Information If you have questions, please visit the Frequently Asked Questions section of the Shoutlet website at [email or web address] Remember, Shoutlet is NOT to be used for urgent needs. For medical emergencies, dial 911. Now available from your iPhone and Android! Please provide this summary of care documentation to your next provider. Your primary care clinician is listed as Porfirio Singleton. If you have any questions after today's visit, please call [phone].

## 2017-06-23 NOTE — PATIENT INSTRUCTIONS
1) Your Hemoglobin A1c (3 month test of blood sugar) is excellent at 6.6%. Keep up the good work. 2)  I will send you a message through Seriosity with your lab results. 3) Keep your current doses of lantus and humalog the same.

## 2017-06-23 NOTE — MR AVS SNAPSHOT
Visit Information Date & Time Provider Department Dept. Phone Encounter #  
 6/23/2017  1:00 PM Pedro Noyola, [address] [facility] [address] [phone] [id number] Follow-up Instructions Return in about 3 months (around 9/23/2017) for general exam . Your Appointments 7/25/2017 11:00 AM  
ROUTINE CARE with Ferdinand Ambrosio MD  
[facility]) Appt Note: Persistent Right Side into back pain//  
 [place] [address] [address]  
[phone]  
  
   
 [place] [address]  [address] [address]  
  
    
 11/30/2017 11:50 AM  
Follow Up with Tarsha Oglesby MD  
[place] [facility]) Appt Note: f/u           5 mon        dm  
 [address] [facility] [address] [phone] [address] Upcoming Health Maintenance Date Due Hepatitis C Screening 1955 COLONOSCOPY 5/1/1973 Pneumococcal 19-64 Medium Risk (1 of 1 - PPSV23) 5/1/1974 DTaP/Tdap/Td series (1 - Tdap) 5/1/1976 ZOSTER VACCINE AGE 60> 5/1/2015 FOOT EXAM Q1 5/9/2017 MICROALBUMIN Q1 5/9/2017 HEMOGLOBIN A1C Q6M 7/23/2017 INFLUENZA AGE 9 TO ADULT 8/1/2017 PAP AKA CERVICAL CYTOLOGY 1/10/2018 LIPID PANEL Q1 1/23/2018 EYE EXAM RETINAL OR DILATED Q1 4/24/2018 BREAST CANCER SCRN MAMMOGRAM 3/28/2019 Allergies as of 6/23/2017  Review Complete On: 6/23/2017 By: Raghav Porter Severity Noted Reaction Type Reactions Carrot Medium 11/03/2010   Systemic Swelling Apple  02/01/2017    Swelling Apple skin causes her lips to swell Simvastatin  07/22/2011    Myalgia Current Immunizations  Reviewed on 11/24/2014 No immunizations on file. Not reviewed this visit You Were Diagnosed With   
  
 Codes Comments Flank pain    -  Primary ICD-10-CM: R10.9 ICD-9-CM: 789.09 Screening for colon cancer     ICD-10-CM: Z12.11 ICD-9-CM: V76.51 Increased urinary frequency     ICD-10-CM: R35.0 ICD-9-CM: 788.41 Vitals BP Pulse Temp Resp Height(growth percentile) Weight(growth percentile) 129/72 (BP 1 Location: Right arm, BP Patient Position: Sitting) 85 98.2 °F (36.8 °C) (Oral) 18 5' 1\" (1.549 m) 153 lb 6.4 oz (69.6 kg) LMP SpO2 BMI OB Status Smoking Status 06/28/2010 99% 28.98 kg/m2 Postmenopausal Former Smoker BMI and BSA Data Body Mass Index Body Surface Area  
 28.98 kg/m 2 1.73 m 2 Preferred Pharmacy Pharm[facility] [address] [phone] Your Updated Medication List  
  
   
This list is accurate as of: 6/23/17  1:44 PM.  Always use your most recent med list.  
  
  
  
  
 aspirin 81 mg tablet Take 81 mg by mouth daily. b complex vitamins tablet Take 1 Tab by mouth daily. Biotin 2,500 mcg Cap Take  by mouth daily. CO Q-10 100 mg capsule Generic drug:  co-enzyme Q-10 Take 100 mg by mouth daily. dulaglutide 0.75 mg/0.5 mL sub-q pen Commonly known as:  TRULICITY  
0.5 mL by SubCUTAneous route every seven (7) days. FREESTYLE LITE METER monitoring kit Generic drug:  Blood-Glucose Meter Test 4 times daily--Dx: E11.65 FREESTYLE LITE STRIPS strip Generic drug:  glucose blood VI test strips Test 4 times daily--Dx: E11.65--90 [place]  
  
 gemfibrozil 600 mg tablet Commonly known as:  LOPID TAKE ONE TABLET BY MOUTH TWICE DAILY  
  
 insulin glargine 100 unit/mL (3 mL) Inpn Commonly known as:  LANAAKASHBASAGLAR  
35-40 Units by SubCUTAneous route nightly. Dose change 6/22/17--updated med list--did not send prescription to the pharmacy  
  
 insulin lispro 100 unit/mL kwikpen Commonly known as:  HumaLOG KwikPen 4 units with breakfast and 6 units with lunch and dinner. Max 45 units per day--Dose change 6/22/17--updated med list--did not send prescription to the pharmacy  
  
 lisinopril 20 mg tablet Commonly known as:  PRINIVIL, ZESTRIL  
TAKE ONE TABLET BY MOUTH ONCE DAILY. metFORMIN 1,000 mg tablet Commonly known as:  GLUCOPHAGE  
TAKE ONE TABLET BY MOUTH TWICE DAILY WITH MEALS  
  
 phentermine 37.5 mg tablet Commonly known as:  ADIPEX-P Take 1 tablet before breakfast  
  
 pravastatin 40 mg tablet Commonly known as:  PRAVACHOL  
TAKE 1/2 TABLET BY MOUTH ONCE DAILY--Dose change 1/23/17--updated med list--did not send prescription to the pharmacy VITAMIN D3 1,000 unit tablet Generic drug:  cholecalciferol Take  by mouth daily. We Performed the Following AMB POC URINALYSIS DIP STICK AUTO W/ MICRO [09685 CPT(R)] CULTURE, URINE [id number] CPT(R)] OCCULT BLOOD, IMMUNOASSAY (FIT) [id number] CPT(R)] Follow-up Instructions Return in about 3 months (around 9/23/2017) for general exam . To-Do List   
 06/23/2017 Imaging:  US RETROPERITONEUM COMP Patient Instructions Flank Pain: Care Instructions Your Care Instructions Flank pain is pain on the side of the back just below the rib cage and above the waist. It can be on one or both sides. Flank pain has many possible causes, including a kidney stone, a urinary tract infection, or back strain. Flank pain may get better on its own. But don't ignore new symptoms, such as fever, nausea and vomiting, urination problems, pain that gets worse, and dizziness. These may be signs of a more serious problem. You may have to have tests or other treatment. Follow-up care is a key part of your treatment and safety. Be sure to make and go to all appointments, and call your doctor if you are having problems. It's also a good idea to know your test results and keep a list of the medicines you take. How can you care for yourself at home? · Rest until you feel better. · Take pain medicines exactly as directed. ¨ If the doctor gave you a prescription medicine for pain, take it as prescribed. ¨ If you are not taking a prescription pain medicine, ask your doctor if you can take an over-the-counter pain medicine, such as acetaminophen (Tylenol), ibuprofen (Advil, Motrin), or naproxen (Aleve). Read and follow all instructions on the label. · If your doctor prescribed antibiotics, take them as directed. Do not stop taking them just because you feel better. You need to take the full course of antibiotics. · To apply heat, put a warm water bottle, a heating pad set on low, or a warm cloth on the painful area. Do not go to sleep with a heating pad on your skin. · To prevent dehydration, drink plenty of fluids, enough so that your urine is light yellow or clear like water. Choose water and other caffeine-free clear liquids until you feel better. If you have kidney, heart, or liver disease and have to limit fluids, talk with your doctor before you increase the amount of fluids you drink. When should you call for help? Call your doctor now or seek immediate medical care if: 
· Your flank pain gets worse. · You have new symptoms, such as fever, nausea, or vomiting. · You have symptoms of a urinary problem. For example: ¨ You have blood or pus in your urine. ¨ You have chills or body aches. ¨ It hurts to urinate. ¨ You have groin or belly pain. Watch closely for changes in your health, and be sure to contact your doctor if you do not get better as expected. Where can you learn more? Go to http://kerrie-bob.info/. Enter S191 in the search box to learn more about \"Flank Pain: Care Instructions. \" Current as of: March 20, 2017 Content Version: 11.3 © 9973-5198 [employer].  Care instructions adapted under license by [address] (which disclaims liability or warranty for this information). If you have questions about a medical condition or this instruction, always ask your healthcare professional. [address] any warranty or liability for your use of this information. Back Stretches: Exercises Your Care Instructions Here are some examples of exercises for stretching your back. Start each exercise slowly. Ease off the exercise if you start to have pain. Your doctor or physical therapist will tell you when you can start these exercises and which ones will work best for you. How to do the exercises Overhead stretch 1. Stand comfortably with your feet shoulder-width apart. 2. Looking straight ahead, raise both arms over your head and reach toward the ceiling. Do not allow your head to tilt back. 3. Hold for 15 to 30 seconds, then lower your arms to your sides. 4. Repeat 2 to 4 times. Side stretch 1. Stand comfortably with your feet shoulder-width apart. 2. Raise one arm over your head, and then lean to the other side. 3. Slide your hand down your leg as you let the weight of your arm gently stretch your side muscles. Hold for 15 to 30 seconds. 4. Repeat 2 to 4 times on each side. Press-up 1. Lie on your stomach, supporting your body with your forearms. 2. Press your elbows down into the floor to raise your upper back. As you do this, relax your stomach muscles and allow your back to arch without using your back muscles. As your press up, do not let your hips or pelvis come off the floor. 3. Hold for 15 to 30 seconds, then relax. 4. Repeat 2 to 4 times. Relax and rest 
 
1. Lie on your back with a rolled towel under your neck and a pillow under your knees. Extend your arms comfortably to your sides. 2. Relax and breathe normally. 3. Remain in this position for about 10 minutes. 4. If you can, do this 2 or 3 times each day. Follow-up care is a key part of your treatment and safety. Be sure to make and go to all appointments, and call your doctor if you are having problems. It's also a good idea to know your test results and keep a list of the medicines you take. Where can you learn more? Go to http://kerrie-bob.info/. Enter S433 in the search box to learn more about \"Back Stretches: Exercises. \" Current as of: March 21, 2017 Content Version: 11.3 © 1043-4661 [employer]. Care instructions adapted under license by [employer] (which disclaims liability or warranty for this information). If you have questions about a medical condition or this instruction, always ask your healthcare professional. [address] any warranty or liability for your use of this information. Introducing [facility] & HEALTH SERVICES! Dear [place]: 
Thank you for requesting a ASOCS account. Our records indicate that you already have an active ASOCS account. You can access your account anytime at [email or web address] Did you know that you can access your hospital and ER discharge instructions at any time in ASOCS? You can also review all of your test results from your hospital stay or ER visit. Additional Information If you have questions, please visit the Frequently Asked Questions section of the ASOCS website at [email or web address] Remember, ASOCS is NOT to be used for urgent needs. For medical emergencies, dial 911. Now available from your iPhone and Android! Please provide this summary of care documentation to your next provider. Your primary care clinician is listed as Jhonny Neil. If you have any questions after today's visit, please call [phone].

## 2017-06-23 NOTE — PROGRESS NOTES
Chief Complaint   Patient presents with    Flank Pain     right side      1. Have you been to the ER, urgent care clinic since your last visit? Hospitalized since your last visit? No    2. Have you seen or consulted any other health care providers outside of the [facility] since your last visit? Include any pap smears or colon screening.  No

## 2017-06-23 NOTE — PROGRESS NOTES
Chief Complaint   Patient presents with    Diabetes     f/u     History of Present Illness: [place] is a 58 y.o. female here for follow up of diabetes. Weight up 1 lbs since last visit in 1/17. Has decreased the lantus to 35-38 units at bedtime and fasting sugars are in the  range if she over indulged the night before. Has cut back on humalog to 4-6 units with breakfast and 6 units with lunch and dinner. Never takes more than 8 units of humalog with meals. Majority of the sugars are under 150 during the day. Only was close to 200 last night due to not eating much during the day and ate spaghetti and lulu misu for dinner. Has been back on phentermine since May. Her  is going overseas to [facility] for 4 months in August.  Has been taking 1/2 tab of pravastatin since last visit and is still having some trouble with memory but this may also be due to stress as she has been under a lot and is not sleeping well. Only getting 3-5 hours a night at most.  Having a lot of trouble with recurrent UTIs. Has had less hair loss with taking 1000 units of D3 daily. Also taking b complex and biotin daily. Current Outpatient Prescriptions   Medication Sig    b complex vitamins tablet Take 1 Tab by mouth daily.  Biotin 2,500 mcg cap Take  by mouth daily.  dulaglutide (TRULICITY) 6.99 QL/7.0 mL sub-q pen 0.5 mL by SubCUTAneous route every seven (7) days.  insulin lispro (HUMALOG KWIKPEN) 100 unit/mL kwikpen by SubCUTAneous route. 3 U PER 15 G OF CARBS. Max 45 units per day    phentermine (ADIPEX-P) 37.5 mg tablet Take 1 tablet before breakfast    FREESTYLE LITE STRIPS strip Test 4 times daily--Dx: E11.65--90 day supply [place]    cholecalciferol (VITAMIN D3) 1,000 unit tablet Take  by mouth daily.     metFORMIN (GLUCOPHAGE) 1,000 mg tablet TAKE ONE TABLET BY MOUTH TWICE DAILY WITH MEALS    gemfibrozil (LOPID) 600 mg tablet TAKE ONE TABLET BY MOUTH TWICE DAILY  lisinopril (PRINIVIL, ZESTRIL) 20 mg tablet TAKE ONE TABLET BY MOUTH ONCE DAILY.  pravastatin (PRAVACHOL) 40 mg tablet TAKE 1/2 TABLET BY MOUTH ONCE DAILY--Dose change 1/23/17--updated med list--did not send prescription to the pharmacy    FREESTYLE LITE METER monitoring kit Test 4 times daily--Dx: E11.65    insulin glargine (LANTUS SOLOSTAR) 100 unit/mL (3 mL) pen 40 Units by SubCUTAneous route daily. Dose change 2/16/17--updated med list--did not send prescription to the pharmacy    coenzyme q10 (CO Q-10) 100 mg Cap Take 100 mg by mouth daily.  aspirin 81 mg tablet Take 81 mg by mouth daily. No current facility-administered medications for this visit.       Allergies   Allergen Reactions    Carrot Swelling    Apple Swelling     Apple skin causes her lips to swell    Simvastatin Myalgia     Review of Systems:  - Eyes: no blurry vision or double vision  - Cardiovascular: no chest pain  - Respiratory: no shortness of breath  - Musculoskeletal: no myalgias  - Neurological: no numbness/tingling in extremities    Physical Examination:  Blood pressure 121/65, pulse 87, height 5' 1\" (1.549 m), weight 152 lb 6.4 oz (69.1 kg), last menstrual period 06/28/2010.  - General: pleasant, no distress, good eye contact   - Neck: no carotid bruits  - Cardiovascular: regular, normal rate, nl s1 and s2, no m/r/g,   - Respiratory: clear bilaterally  - Integumentary: no edema,   - Psychiatric: normal mood and affect         Diabetic foot exam performed by Mekhi Mcgarry MD     Measurement  Response Nurse Comment Physician Comment   Monofilament  R - reduced sensation with micro filament  L - reduced sensation with micro filament     Pulse DP R - 2+ (normal)  L - 2+ (normal)     Vibration R - decreased    L - decreased     Structural deformity R - None  L - None     Skin Integrity / Deformity R - Mild - callus  L - Mild - callus        Reviewed by:                 Data Reviewed: Component      Latest Ref Rng & Units 6/23/2017          11:45 AM   Hemoglobin A1c (POC)      % 6.6       Assessment/Plan:     1. DM w/o complication type II, uncontrolled (250.02) her most recent Hgb A1c was 6.6% in 6/17 down from 7.9% in 1/17 up from 7.8% in 5/16 down from 8.9% in 2/16 up from 8.1% in 5/15 up from 7.5% in 1/15 down from 8.1% in 7/14 up from 7.1% in 4/14 in [facility] down from 7.2% in 11/13 up from 6.9% in 8/13 down from 7.3% in 4/13 up from 6.9% in Nov up from 6.2% in August down from 6.5% in April up from 6.4% in December down from 6.9% in May 2011 down from 7.9% in November 2010 down from 9.2% in March 2010 prior to starting insulin. A1c is the best it's been in 4 years with starting trulicity. - cont Lantus 35-40 units at bedtime     - cont Humalog 4-6 units with meals  - cont Metformin 1g bid  - cont trulicity 3.69 mg weekly  - foot exam done 6/17  - optho UTD 4/17  - microalbumin 153 11/10 down to 30.1 in 1/12, up to 58 in 4/13 (increased lis to 20 at that time) and down to 25 in 8/13, up to 47 in 5/15 and stable at 47 in 5/16  - check bs 3-4x day   - check Hgb A1c and cmp and microalbumin today       2. Unspecified essential hypertension (401.9) her BP was at goal < 140/90   - cont lisinopril 20 mg daily       3. Other and unspecified hyperlipidemia (272.4) Given DM, Goal LDL < 100, non-HDL < 130, and TG < 150. Myalgias with simvastatin. Was changed from gemfibrozil to pravastatin in May 2012.  with non-HDL of 149 at that time off therapy down to 62 and 126 in August. TGs > 500 in 11/12 and down to 353 in 4/13 but up to 741 in 8/13 so restarted gemfibrozil at that time. TG down to 285 in 11/13. Up to 343 in 7/14. Down to 183 in 1/15. LDL 85 and  in 5/15.  and  in 2/16. LDL 65 and  in 5/16 with lower A1c. LDL 87 and  in 1/17. Having some memory loss so decreased dose to 1/2 tab daily  - cont gemfibrozil 600 mg bid  - cont prava 1/2 of 40 mg daily  - check lipids today       4. Unspecified vitamin D deficiency (268.9) Level was 19 in November 2010 on 2000 units daily so increased to 4000 units daily and it was up to 26.3 in May 2011. On 5000 units daily her level was 24 in January 2012 so I increased her to 10,000 units daily at that time and level was 68 in April 2012 and 62 in August and 72 in November and 69 in 4/13 so decreased her dose to 5000 units at that time and level 40 in 8/13. Calcium level was up in 11/13 to 11.0 so stopped vitamin D at that time and level 32 in 5/15. Down to 24 in 2/16 and 28 in 1/17. Restarted 1000 units daily. - cont vitamin D 1000 units daily  - check Vitamin D 25-OH level today       5. Hypercalcemia: Had a level of 10.4 in 1/12 and no other abnormal values until 10.5 in 8/13 and up to 11 in 11/13. Stopped vitamin D and mvi at that time. Down to 10 in 6/14. Found to have a 2 mm stone on CT scan in [facility] in 4/14. Will hold on further evaluation given she doesn't have insurance at this time but will plan on drawing a PTH level in the future. Repeat calcium was 11 in 1/15 and 10.4 in 5/15. Up to 10.9 in 2/16 but down to 10.2 in 5/16 and in 1/17  - follow on cmp    6. Obesity: weight down 8 lbs from 5/15 to 2/16 and 2 lbs by 5/16 but only taking 1/2 tab in am consistently and down 5 lbs by 1/17. Wt up 1 lb by 6/17.  - cont phentermine 37.5 mg but take 1 whole tab daily  - trulicity as above      Patient Instructions   1) Your Hemoglobin A1c (3 month test of blood sugar) is excellent at 6.6%. Keep up the good work. 2)  I will send you a message through "[facility] [facility] with your lab results. 3) Keep your current doses of lantus and humalog the same. Follow-up Disposition:  Return in about 5 months (around 11/23/2017).     Copy sent to:  Dr. Isabella Vázquez via [facility]    Lab follow up: 6/24/17  Component      Latest Ref Rng & Units 6/23/2017 6/23/2017 6/23/2017 6/23/2017          11:50 AM 11:50 AM 11:50 AM 11:50 AM   Glucose      65 - 99 mg/dL   132 (H)    BUN      8 - 27 mg/dL   17    Creatinine      0.57 - 1.00 mg/dL   0.62    GFR est non-AA      >59 mL/min/1.73   97    GFR est AA      >59 mL/min/1.73   112    BUN/Creatinine ratio      12 - 28   27    Sodium      134 - 144 mmol/L   141    Potassium      3.5 - 5.2 mmol/L   4.1    Chloride      96 - 106 mmol/L   101    CO2      18 - 29 mmol/L   22    Calcium      8.7 - 10.3 mg/dL   9.7    Protein, total      6.0 - 8.5 g/dL   7.7    Albumin      3.6 - 4.8 g/dL   4.5    GLOBULIN, TOTAL      1.5 - 4.5 g/dL   3.2    A-G Ratio      1.2 - 2.2   1.4    Bilirubin, total      0.0 - 1.2 mg/dL   <0.2    Alk. phosphatase      39 - 117 IU/L   93    AST      0 - 40 IU/L   36    ALT (SGPT)      0 - 32 IU/L   40 (H)    Cholesterol, total      100 - 199 mg/dL  167     Triglyceride      0 - 149 mg/dL  265 (H)     HDL Cholesterol      >39 mg/dL  37 (L)     VLDL, calculated      5 - 40 mg/dL  53 (H)     LDL, calculated      0 - 99 mg/dL  77     Creatinine, urine      Not Estab. mg/dL 51.5      Microalbumin, urine      Not Estab. ug/mL 22.3      Microalbumin/Creat. Ratio      0.0 - 30.0 mg/g creat 43.3 (H)      VITAMIN D, 25-HYDROXY      30.0 - 100.0 ng/mL    31.5     Sent her the following message through [employer]:    Total Cholesterol is the total number of cholesterol particles in your blood. Goal is less than 200. Your value is at goal.    Triglycerides are the short term fats in your blood. Goal is less than 150. Your value is above goal.    HDL is the good cholesterol in your blood. Goal is more than 50. Your value is below goal.    LDL is the bad cholesterol in your blood. Goal is less than 100. Your value is at goal so I think it's fine to stay on the 1/2 tab of pravastatin for now.     I was thinking more about your back/flank pain and if this continues, I would like you to try stopping the gemfibrozil for 2 weeks as some patients can have muscle pains due to the combination of a statin (you are on pravastatin) and fibrate (you are on gemfibrozil). If you end up stopping the gemfibrozil and feel better off this, please let me know. Continue to follow a low cholesterol diet. Try to limit the amount of fried foods, fatty foods, butter, gravy, red meat, ice cream, cheese, and eggs in your diet, which are all high in cholesterol. Take all of your medications (pravastatin and gemfibrozil) as directed.  -------------------------------------------------------------------------------------------------------------------  BUN and creatinine are markers of kidney function. Your values are normal.  -------------------------------------------------------------------------------------------------------------------  ALT and AST are markers of liver function. Your ALT is slightly abnormal and likely elevated due to fatty deposition in the liver that can occur with weight gain. Hopefully with weight loss, this values will return to normal.  -------------------------------------------------------------------------------------------------------------------  Your vitamin D level is 31 which is normal.  Goal is over 30.   Please continue to take 1000 units of vitamin D daily to keep your levels at goal especially since restarting this has not raised your calcium level outside of the normal range and previously you had high calcium levels and this was the reason I had stopped the vitamin D.

## 2017-06-23 NOTE — PATIENT INSTRUCTIONS
Flank Pain: Care Instructions  Your Care Instructions  Flank pain is pain on the side of the back just below the rib cage and above the waist. It can be on one or both sides. Flank pain has many possible causes, including a kidney stone, a urinary tract infection, or back strain. Flank pain may get better on its own. But don't ignore new symptoms, such as fever, nausea and vomiting, urination problems, pain that gets worse, and dizziness. These may be signs of a more serious problem. You may have to have tests or other treatment. Follow-up care is a key part of your treatment and safety. Be sure to make and go to all appointments, and call your doctor if you are having problems. It's also a good idea to know your test results and keep a list of the medicines you take. How can you care for yourself at home? · Rest until you feel better. · Take pain medicines exactly as directed. ¨ If the doctor gave you a prescription medicine for pain, take it as prescribed. ¨ If you are not taking a prescription pain medicine, ask your doctor if you can take an over-the-counter pain medicine, such as acetaminophen (Tylenol), ibuprofen (Advil, Motrin), or naproxen (Aleve). Read and follow all instructions on the label. · If your doctor prescribed antibiotics, take them as directed. Do not stop taking them just because you feel better. You need to take the full course of antibiotics. · To apply heat, put a warm water bottle, a heating pad set on low, or a warm cloth on the painful area. Do not go to sleep with a heating pad on your skin. · To prevent dehydration, drink plenty of fluids, enough so that your urine is light yellow or clear like water. Choose water and other caffeine-free clear liquids until you feel better. If you have kidney, heart, or liver disease and have to limit fluids, talk with your doctor before you increase the amount of fluids you drink. When should you call for help?   Call your doctor now or seek immediate medical care if:  · Your flank pain gets worse. · You have new symptoms, such as fever, nausea, or vomiting. · You have symptoms of a urinary problem. For example:  ¨ You have blood or pus in your urine. ¨ You have chills or body aches. ¨ It hurts to urinate. ¨ You have groin or belly pain. Watch closely for changes in your health, and be sure to contact your doctor if you do not get better as expected. Where can you learn more? Go to http://kerrie-bob.info/. Enter S191 in the search box to learn more about \"Flank Pain: Care Instructions. \"  Current as of: March 20, 2017  Content Version: 11.3  © 8054-7007 [employer]. Care instructions adapted under license by [employer] (which disclaims liability or warranty for this information). If you have questions about a medical condition or this instruction, always ask your healthcare professional. [place] [address] any warranty or liability for your use of this information. Back Stretches: Exercises  Your Care Instructions  Here are some examples of exercises for stretching your back. Start each exercise slowly. Ease off the exercise if you start to have pain. Your doctor or physical therapist will tell you when you can start these exercises and which ones will work best for you. How to do the exercises  Overhead stretch    1. Stand comfortably with your feet shoulder-width apart. 2. Looking straight ahead, raise both arms over your head and reach toward the ceiling. Do not allow your head to tilt back. 3. Hold for 15 to 30 seconds, then lower your arms to your sides. 4. Repeat 2 to 4 times. Side stretch    1. Stand comfortably with your feet shoulder-width apart. 2. Raise one arm over your head, and then lean to the other side. 3. Slide your hand down your leg as you let the weight of your arm gently stretch your side muscles. Hold for 15 to 30 seconds.   4. Repeat 2 to 4 times on each side. Press-up    1. Lie on your stomach, supporting your body with your forearms. 2. Press your elbows down into the floor to raise your upper back. As you do this, relax your stomach muscles and allow your back to arch without using your back muscles. As your press up, do not let your hips or pelvis come off the floor. 3. Hold for 15 to 30 seconds, then relax. 4. Repeat 2 to 4 times. Relax and rest    1. Lie on your back with a rolled towel under your neck and a pillow under your knees. Extend your arms comfortably to your sides. 2. Relax and breathe normally. 3. Remain in this position for about 10 minutes. 4. If you can, do this 2 or 3 times each day. Follow-up care is a key part of your treatment and safety. Be sure to make and go to all appointments, and call your doctor if you are having problems. It's also a good idea to know your test results and keep a list of the medicines you take. Where can you learn more? Go to http://kerrie-bob.info/. Enter I003 in the search box to learn more about \"Back Stretches: Exercises. \"  Current as of: March 21, 2017  Content Version: 11.3  © 7247-4445 [employer]. Care instructions adapted under license by [employer] (which disclaims liability or warranty for this information). If you have questions about a medical condition or this instruction, always ask your healthcare professional. [place] [address] any warranty or liability for your use of this information.

## 2017-06-23 NOTE — PROGRESS NOTES
CC: Flank Pain (right side )      HPI:    She is a 58 y.o. female who presents for evaluation of intermittent recurrent pain in right back  side. Patient states she thought the was the pain was related to previous UTIs  This is ongoing for over a year  Pain is below rib cage on the right - feels like \" someone punched\" soreness. Feels bloated in the morning  Notes increased frequency with urinating  For the past several weeks. Denies dysuria     Reviewed with patient that   CT scan was normal in 2015 abdomen and pelvis    ROS:  Constitutional: negative for fevers, chills, anorexia and weight loss  Eyes:   negative for visual disturbance,  irritation  ENT:   negative for tinnitus,sore throat,nasal congestion,ear pain, sinus pain. Respiratory:  negative for cough, hemoptysis, dyspnea,wheezing  CV:   negative for chest pain, palpitations, lower extremity edema  GI:   negative for nausea, vomiting, diarrhea, abdominal pain,melena  Genitourinary: see HPI   Musculoskel: See HPI  Neurological:  negative for headaches, dizziness, focal weakness, numbness  Psych:             Negative for depression and anxiety    Past Medical History:   Diagnosis Date    Chest pain 2012    Saw Dr. Yobani Díaz, negative stress test    Diabetes [facility])     Diverticulosis     seen on CT scan    Hyperlipidemia     Hypertension     Obesity     Sensory neuropathy 2012    Tingling L toe    Tendonitis, Achilles, left 2012    Type II or unspecified type diabetes mellitus without mention of complication, uncontrolled     Vitamin D deficiency        Current Outpatient Prescriptions on File Prior to Visit   Medication Sig Dispense Refill    b complex vitamins tablet Take 1 Tab by mouth daily.  Biotin 2,500 mcg cap Take  by mouth daily.  insulin lispro (HUMALOG KWIKPEN) 100 unit/mL kwikpen 4 units with breakfast and 6 units with lunch and dinner.   Max 45 units per day--Dose change 6/22/17--updated med list--did not send prescription to the pharmacy 60 mL 3    insulin glargine (LANTUS,BASAGLAR) 100 unit/mL (3 mL) inpn 35-40 Units by SubCUTAneous route nightly. Dose change 6/22/17--updated med list--did not send prescription to the pharmacy      dulaglutide (TRULICITY) 3.01 BT/3.1 mL sub-q pen 0.5 mL by SubCUTAneous route every seven (7) days. 16 Syringe 3    phentermine (ADIPEX-P) 37.5 mg tablet Take 1 tablet before breakfast 100 Tab 1    FREESTYLE LITE STRIPS strip Test 4 times daily--Dx: E11.65--[place] 400 Strip 3    cholecalciferol (VITAMIN D3) 1,000 unit tablet Take  by mouth daily.  metFORMIN (GLUCOPHAGE) 1,000 mg tablet TAKE ONE TABLET BY MOUTH TWICE DAILY WITH MEALS 180 Tab 3    gemfibrozil (LOPID) 600 mg tablet TAKE ONE TABLET BY MOUTH TWICE DAILY 180 Tab 3    lisinopril (PRINIVIL, ZESTRIL) 20 mg tablet TAKE ONE TABLET BY MOUTH ONCE DAILY. 90 Tab 3    pravastatin (PRAVACHOL) 40 mg tablet TAKE 1/2 TABLET BY MOUTH ONCE DAILY--Dose change 1/23/17--updated med list--did not send prescription to the pharmacy 90 Tab 3    FREESTYLE LITE METER monitoring kit Test 4 times daily--Dx: E11.65 1 Kit 0    coenzyme q10 (CO Q-10) 100 mg Cap Take 100 mg by mouth daily.  aspirin 81 mg tablet Take 81 mg by mouth daily. No current facility-administered medications on file prior to visit. Past Surgical History:   Procedure Laterality Date    TYMPANOPLASTY      left       Family History   Problem Relation Age of Onset    Diabetes Father     Heart Disease Father 46     MI    Diabetes Brother     Diabetes Other      multiple family members on father's side    Diabetes Brother     Diabetes Brother     Stroke Neg Hx      Reviewed and no changes     Social History     Social History    Marital status:      Spouse name: N/A    Number of children: N/A    Years of education: N/A     Occupational History    Not on file.      Social History Main Topics    Smoking status: Former Smoker     Packs/day: 0.50     Years: 20.00     Quit date: 11/3/2004    Smokeless tobacco: Never Used    Alcohol use No    Drug use: No    Sexual activity: Yes     Partners: Male     Other Topics Concern    Not on file     Social History Narrative    Lives in [place] with  of 10 years. Has a 33 yo daughter from a previous marriage. Used to work as a  for [employer] and a [employer] in [place] and for [employer]. Likes to read, cook, and shop.              Visit Vitals    /72 (BP 1 Location: Right arm, BP Patient Position: Sitting)    Pulse 85    Temp 98.2 °F (36.8 °C) (Oral)    Resp 18    Ht 5' 1\" (1.549 m)    Wt 153 lb 6.4 oz (69.6 kg)    LMP 06/28/2010    SpO2 99%    BMI 28.98 kg/m2       Physical Examination:   General - Well appearing female  HEENT - PERRL, TM no erythema/opacification, normal nasal turbinates, oropharynx no erythema or exudate, MMM  Neck - supple, no bruits, no TMG, no LAD  Pulm - clear to auscultation bilaterally  Cardio - RRR, normal S1 S2, no murmur gallops or rubs  Abd - soft, nontender, no masses, no HSM  No CVA tenderness   Extrem - no edema, +2 distal pulses  Psych - normal affect, appropriate mood    Lab Results   Component Value Date/Time    WBC 8.0 01/23/2017 04:59 PM    HGB 14.2 01/23/2017 04:59 PM    HCT 41.9 01/23/2017 04:59 PM    PLATELET 613 53/46/3431 04:59 PM    MCV 89 01/23/2017 04:59 PM     Lab Results   Component Value Date/Time    Sodium 141 04/06/2017 12:10 PM    Potassium 3.9 04/06/2017 12:10 PM    Chloride 100 04/06/2017 12:10 PM    CO2 23 04/06/2017 12:10 PM    Anion gap 10 07/03/2015 01:20 AM    Glucose 120 04/06/2017 12:10 PM    BUN 13 04/06/2017 12:10 PM    Creatinine 0.58 04/06/2017 12:10 PM    BUN/Creatinine ratio 22 04/06/2017 12:10 PM    GFR est  04/06/2017 12:10 PM    GFR est non- 04/06/2017 12:10 PM    Calcium 9.8 04/06/2017 12:10 PM     Lab Results   Component Value Date/Time    Cholesterol, total 160 01/23/2017 04:59 PM    HDL Cholesterol 43 01/23/2017 04:59 PM    LDL,Direct 102 11/11/2013 03:25 PM    LDL, calculated 87 01/23/2017 04:59 PM    VLDL, calculated 30 01/23/2017 04:59 PM    Triglyceride 152 01/23/2017 04:59 PM    CHOL/HDL Ratio 5.5 11/03/2010 10:07 AM     Lab Results   Component Value Date/Time    TSH 1.310 01/23/2017 04:59 PM     No results found for: PSA, PSA2, PSAR1, Dana Stakes, PSAR3, [id number], [id number], PSALT  Lab Results   Component Value Date/Time    Hemoglobin A1c 7.8 05/09/2016 02:00 PM    Hemoglobin A1c (POC) 6.6 06/23/2017 11:45 AM     Lab Results   Component Value Date/Time    Vitamin D 25-Hydroxy 26.3 05/17/2011 02:41 PM    VITAMIN D, 25-HYDROXY 29.6 01/23/2017 04:59 PM       Lab Results   Component Value Date/Time    ALT (SGPT) 30 01/23/2017 04:59 PM    AST (SGOT) 31 01/23/2017 04:59 PM    Alk.  phosphatase 100 01/23/2017 04:59 PM    Bilirubin, total 0.4 01/23/2017 04:59 PM           Assessment/Plan:    59 yo woman with a hx of diabetes presenting with pain in right lower back/ flank    Flank pain intermittent and chronic   - suspect muscle spasms versus renal stones / given muscles stretch exercises   - AMB POC URINALYSIS DIP STICK AUTO W/ MICRO showed leukocytes otherwise normal   - no dysuria will send urine for culture/ doubt UTI   - US RETROPERITONEUM COMP; Future to evaluate for flank pain   - CULTURE, URINE  - reviewed CT abdomen and pelvis done 2 years ago and normal  - BMP done at endocrinologist today     Screening for colon cancer  - patient cannot afford colonoscopy   - OCCULT BLOOD, IMMUNOASSAY (FIT)  Follow-up Disposition:  Return in about 3 months (around 9/23/2017) for general exam .    Vaibhav Lo MD

## 2017-06-23 NOTE — LETTER
6/23/2017 11:51 AM 
 
Ms. Bridgette Howard [id number] [address] [id number] To Whom It May Concern, 
 
Bridgette Howard is a patient of mine who has diabetes. she is currently managing her diabetes with insulin. she needs to have access to her insulin pen and pen needles and her insulin vial and syringes along with her testing supplies at all times to control her blood sugar. Please allow her to proceed through security with these items. If you have any questions, please do not hesitate to contact my office at [phone]. Sincerely, 
 
 
 
Abdullahi Mayes MD 
[address] [facility] 3rd floor, [address] 
[phone] (phone) [phone] (fax)

## 2017-06-24 LAB
25(OH)D3+25(OH)D2 SERPL-MCNC: 31.5 NG/ML (ref 30–100)
ALBUMIN SERPL-MCNC: 4.5 G/DL (ref 3.6–4.8)
ALBUMIN/CREAT UR: 43.3 MG/G CREAT (ref 0–30)
ALBUMIN/GLOB SERPL: 1.4 {RATIO} (ref 1.2–2.2)
ALP SERPL-CCNC: 93 IU/L (ref 39–117)
ALT SERPL-CCNC: 40 IU/L (ref 0–32)
AST SERPL-CCNC: 36 IU/L (ref 0–40)
BACTERIA UR CULT: NO GROWTH
BILIRUB SERPL-MCNC: <0.2 MG/DL (ref 0–1.2)
BUN SERPL-MCNC: 17 MG/DL (ref 8–27)
BUN/CREAT SERPL: 27 (ref 12–28)
CALCIUM SERPL-MCNC: 9.7 MG/DL (ref 8.7–10.3)
CHLORIDE SERPL-SCNC: 101 MMOL/L (ref 96–106)
CHOLEST SERPL-MCNC: 167 MG/DL (ref 100–199)
CO2 SERPL-SCNC: 22 MMOL/L (ref 18–29)
CREAT SERPL-MCNC: 0.62 MG/DL (ref 0.57–1)
CREAT UR-MCNC: 51.5 MG/DL
GLOBULIN SER CALC-MCNC: 3.2 G/DL (ref 1.5–4.5)
GLUCOSE SERPL-MCNC: 132 MG/DL (ref 65–99)
HDLC SERPL-MCNC: 37 MG/DL
INTERPRETATION, 910389: NORMAL
LDLC SERPL CALC-MCNC: 77 MG/DL (ref 0–99)
Lab: NORMAL
MICROALBUMIN UR-MCNC: 22.3 UG/ML
POTASSIUM SERPL-SCNC: 4.1 MMOL/L (ref 3.5–5.2)
PROT SERPL-MCNC: 7.7 G/DL (ref 6–8.5)
SODIUM SERPL-SCNC: 141 MMOL/L (ref 134–144)
TRIGL SERPL-MCNC: 265 MG/DL (ref 0–149)
VLDLC SERPL CALC-MCNC: 53 MG/DL (ref 5–40)

## 2017-06-25 ENCOUNTER — TELEPHONE (OUTPATIENT)
Dept: INTERNAL MEDICINE CLINIC | Age: 62
End: 2017-06-25

## 2017-06-26 NOTE — PROGRESS NOTES
Urine culture is negative no evidence of infection   I reviewed Dr Jessi Gonzalez note recommending that you stop the gemfibrozil  And agree that you should do that and see if back/ flank pain improves.

## 2017-07-02 LAB — HEMOCCULT STL QL IA: NEGATIVE

## 2017-07-05 ENCOUNTER — HOSPITAL ENCOUNTER (OUTPATIENT)
Dept: ULTRASOUND IMAGING | Age: 62
Discharge: HOME | End: 2017-07-05
Attending: INTERNAL MEDICINE
Payer: SUBSIDIZED

## 2017-07-05 DIAGNOSIS — R10.9 FLANK PAIN: ICD-10-CM

## 2017-07-05 PROCEDURE — 76770 US EXAM ABDO BACK WALL COMP: CPT

## 2017-07-07 DIAGNOSIS — N28.1 RENAL CYST: Primary | ICD-10-CM

## 2017-07-07 NOTE — PROGRESS NOTES
Please notify patient that there is a 1.4 cm right lower pole renal cyst / otherwise normal. Renal cysts are typically benign.  I am referring her to urologist for further assessment of renal cyst.

## 2017-07-11 NOTE — PROGRESS NOTES
Spoke with patient. Advised of results per Dr. Jacquelyn Archer. Patient is asking for a urologist within [facility] since she has the Care Card. Please advise.

## 2017-07-12 NOTE — PROGRESS NOTES
Spoke with patient. She is agreeable to seeing urologist at [facility].  Please enter referral.

## 2017-07-13 DIAGNOSIS — N28.1 RENAL CYST: Primary | ICD-10-CM

## 2017-07-18 ENCOUNTER — TELEPHONE (OUTPATIENT)
Dept: ENDOCRINOLOGY | Age: 62
End: 2017-07-18

## 2017-07-18 NOTE — TELEPHONE ENCOUNTER
Spoke to Mary at [facility]. Mary advised me to contact the patient and ask for a W-2 form instead of the form that was faxed to them. I contacted Ms. Mendez and asked her if she could bring a copy of her W-2 form by the office, so I could fax to [place]? Ms. Nova Marion said she was going to talk to Mary directly and get this straight. Ms. Nova Marion is not in favor of sending her W-2 form to [facility]. I asked the patient to call me back, once she had spoken to Mary.

## 2017-07-18 NOTE — TELEPHONE ENCOUNTER
Ms. Shahla Walton called back to let me know she spoke to Bobby at [facility] and the problem with needing verification of income has been resolved. Apparently - the paperwork submitted had the amount before taxes on there and Ms. Andrea had given them the amount after taxes and the 2 amounts differed. Bobby assured her that he would take care of this and nothing else would be required at this time.

## 2017-07-24 RX ORDER — PHENTERMINE HYDROCHLORIDE 37.5 MG/1
TABLET ORAL
Qty: 100 TAB | Refills: 1 | Status: SHIPPED | OUTPATIENT
Start: 2017-07-24 | End: 2018-02-22 | Stop reason: SDUPTHER

## 2017-08-01 ENCOUNTER — TELEPHONE (OUTPATIENT)
Dept: ENDOCRINOLOGY | Age: 62
End: 2017-08-01

## 2017-10-06 ENCOUNTER — TELEPHONE (OUTPATIENT)
Dept: ENDOCRINOLOGY | Age: 62
End: 2017-10-06

## 2017-10-06 NOTE — TELEPHONE ENCOUNTER
----- Message from Doretha Trent sent at 10/6/2017  2:39 PM EDT -----  Tahirathomas Garvey picked the medication up this afternoon. Thank you.      ----- Message -----     From: Gt Tovar MD     Sent: 9/14/2017   1:30 PM       To: Duarte Gar will be coming sometime in the next few days to  Ms. Mendez's insulin. She has given permission for him to do this as listed in her note in [place]. I told her to have him bring a photo ID. Thanks.

## 2017-10-12 ENCOUNTER — TELEPHONE (OUTPATIENT)
Dept: ENDOCRINOLOGY | Age: 62
End: 2017-10-12

## 2017-12-08 ENCOUNTER — PATIENT MESSAGE (OUTPATIENT)
Dept: ENDOCRINOLOGY | Age: 62
End: 2017-12-08

## 2018-01-23 ENCOUNTER — TELEPHONE (OUTPATIENT)
Dept: ENDOCRINOLOGY | Age: 63
End: 2018-01-23

## 2018-01-23 NOTE — TELEPHONE ENCOUNTER
Patient's Trulicity arrived today from PAP. Please encourage patient to  both boxes of PAP ASAP. Thank you.

## 2018-01-26 ENCOUNTER — TELEPHONE (OUTPATIENT)
Dept: INTERNAL MEDICINE CLINIC | Age: 63
End: 2018-01-26

## 2018-01-26 NOTE — TELEPHONE ENCOUNTER
Attempted to call patient, All numbers listed are not working. Sent patient a message in [address] [place] [address] [address].

## 2018-01-26 NOTE — TELEPHONE ENCOUNTER
----- Message from Matilda Eckert sent at 1/26/2018 10:30 AM EST -----  Regarding: Dr.Appa Rowe/Telephone  Pt would like to know if they can get the Pneumococcal 19-64 Medium Risk injection when they come in to their appointment on 03.01.18    Best contact is [phone]  [facility] [phone]      Message copied/pasted from [facility]

## 2018-02-22 ENCOUNTER — OFFICE VISIT (OUTPATIENT)
Dept: ENDOCRINOLOGY | Age: 63
End: 2018-02-22

## 2018-02-22 VITALS
WEIGHT: 150.2 LBS | SYSTOLIC BLOOD PRESSURE: 120 MMHG | DIASTOLIC BLOOD PRESSURE: 61 MMHG | HEART RATE: 85 BPM | BODY MASS INDEX: 28.36 KG/M2 | HEIGHT: 61 IN

## 2018-02-22 DIAGNOSIS — E55.9 VITAMIN D DEFICIENCY: ICD-10-CM

## 2018-02-22 DIAGNOSIS — E83.52 HYPERCALCEMIA: ICD-10-CM

## 2018-02-22 DIAGNOSIS — E78.5 HYPERLIPIDEMIA LDL GOAL <100: ICD-10-CM

## 2018-02-22 DIAGNOSIS — I10 ESSENTIAL HYPERTENSION, BENIGN: ICD-10-CM

## 2018-02-22 DIAGNOSIS — E11.21 TYPE 2 DIABETES WITH NEPHROPATHY (HCC): Primary | ICD-10-CM

## 2018-02-22 DIAGNOSIS — L65.9 HAIR LOSS: ICD-10-CM

## 2018-02-22 DIAGNOSIS — E66.3 OVERWEIGHT: ICD-10-CM

## 2018-02-22 LAB — HBA1C MFR BLD HPLC: 7.1 %

## 2018-02-22 RX ORDER — PRAVASTATIN SODIUM 20 MG/1
TABLET ORAL
Qty: 90 TAB | Refills: 3 | Status: SHIPPED | OUTPATIENT
Start: 2018-02-22 | End: 2018-03-13 | Stop reason: SDUPTHER

## 2018-02-22 RX ORDER — PHENTERMINE HYDROCHLORIDE 37.5 MG/1
TABLET ORAL
Qty: 100 TAB | Refills: 1 | Status: SHIPPED | OUTPATIENT
Start: 2018-02-22 | End: 2019-01-29 | Stop reason: SDUPTHER

## 2018-02-22 RX ORDER — METFORMIN HYDROCHLORIDE 500 MG/1
TABLET ORAL
Qty: 270 TAB | Refills: 3 | Status: SHIPPED | OUTPATIENT
Start: 2018-02-22 | End: 2018-03-26 | Stop reason: ALTCHOICE

## 2018-02-22 RX ORDER — LISINOPRIL 20 MG/1
TABLET ORAL
Qty: 90 TAB | Refills: 3 | Status: SHIPPED | OUTPATIENT
Start: 2018-02-22 | End: 2019-01-29 | Stop reason: SDUPTHER

## 2018-02-22 NOTE — MR AVS SNAPSHOT
[address] [facility] [address] [address] [phone] Patient: [place] MRN: [id number] DKA:[id number] Visit Information Date & Time Provider Department Dept. Phone Encounter #  
 2/22/2018  9:10 AM Wilma Blue, [address] [facility] Diabetes and Endocrinology [phone] [id number] Follow-up Instructions Return in about 6 months (around 8/22/2018). Your Appointments 3/1/2018 11:00 AM  
New Patient with Philly Leong, [address] [place] [address],4Th Floor [facility]) Appt Note: NP est care//transfer from Dr. Zackary Mejia; NP est care//transfer from Dr. Thompson//r/s/from 1/11/17//BAM-12/28/17  
 [address] [facility] [address] [id number]  
[address] [facility] Upcoming Health Maintenance Date Due Hepatitis C Screening 1955 COLONOSCOPY 5/1/1973 Pneumococcal 19-64 Medium Risk (1 of 1 - PPSV23) 5/1/1974 DTaP/Tdap/Td series (1 - Tdap) 5/1/1976 ZOSTER VACCINE AGE 60> 3/1/2015 Influenza Age 5 to Adult 8/1/2017 HEMOGLOBIN A1C Q6M 12/23/2017 PAP AKA CERVICAL CYTOLOGY 1/10/2018 EYE EXAM RETINAL OR DILATED Q1 4/24/2018 FOOT EXAM Q1 6/23/2018 MICROALBUMIN Q1 6/23/2018 LIPID PANEL Q1 6/23/2018 BREAST CANCER SCRN MAMMOGRAM 3/28/2019 Allergies as of 2/22/2018  Review Complete On: 2/22/2018 By: Wilma Blue MD  
  
 Severity Noted Reaction Type Reactions Carrot Medium 11/03/2010   Systemic Swelling Apple  02/01/2017    Swelling Apple skin causes her lips to swell Gemfibrozil  07/10/2017    Other (comments) Back and flank pain that has improved with stopping when taking in combination with pravastatin Simvastatin  07/22/2011    Myalgia Current Immunizations  Reviewed on 11/24/2014 No immunizations on file. Not reviewed this visit You Were Diagnosed With   
  
 Codes Comments Type 2 diabetes with nephropathy (HCC)    -  Primary ICD-10-CM: E11.21 
ICD-9-CM: 250.40, 583.81 Essential hypertension, benign     ICD-10-CM: I10 
ICD-9-CM: 401.1 Hyperlipidemia LDL goal <100     ICD-10-CM: E78.5 ICD-9-CM: 272.4 Vitamin D deficiency     ICD-10-CM: E55.9 ICD-9-CM: 268.9 Hypercalcemia     ICD-10-CM: T97.23 
ICD-9-CM: 275.42 Overweight     ICD-10-CM: O09.2 ICD-9-CM: 278.02 Hair loss     ICD-10-CM: L65.9 ICD-9-CM: 704.00 Vitals BP Pulse Height(growth percentile) Weight(growth percentile) LMP BMI  
 120/61 85 5' 1\" (1.549 m) 150 lb 3.2 oz (68.1 kg) 06/28/2010 28.38 kg/m2 OB Status Smoking Status Postmenopausal Former Smoker Vitals History BMI and BSA Data Body Mass Index Body Surface Area  
 28.38 kg/m 2 1.71 m 2 Preferred Pharmacy Pharmacy Name Phone [facility] Nelida Peck [facility] [phone] Your Updated Medication List  
  
   
This list is accurate as of 2/22/18 10:11 AM.  Always use your most recent med list.  
  
  
  
  
 aspirin 81 mg tablet Take 81 mg by mouth daily. b complex vitamins tablet Take 1 Tab by mouth daily. CO Q-10 100 mg capsule Generic drug:  co-enzyme Q-10 Take 100 mg by mouth daily. dulaglutide 0.75 mg/0.5 mL sub-q pen Commonly known as:  TRULICITY  
0.5 mL by SubCUTAneous route every seven (7) days. FREESTYLE LITE METER monitoring kit Generic drug:  Blood-Glucose Meter Test 4 times daily--Dx: E11.65 FREESTYLE LITE STRIPS strip Generic drug:  glucose blood VI test strips Test 4 times daily--Dx: E11.65--[place]  
  
 insulin glargine 100 unit/mL (3 mL) Inpn Commonly known as:  LANTUS,BASAGLAR  
35-40 Units by SubCUTAneous route nightly. Dose change 6/22/17--updated med list--did not send prescription to the pharmacy insulin lispro 100 unit/mL kwikpen Commonly known as:  HumaLOG KwikPen Insulin  
4 units with breakfast and 6 units with lunch and dinner. Max 45 units per day--Dose change 6/22/17--updated med list--did not send prescription to the pharmacy  
  
 lisinopril 20 mg tablet Commonly known as:  PRINIVIL, ZESTRIL  
TAKE ONE TABLET BY MOUTH ONCE DAILY. metFORMIN 500 mg tablet Commonly known as:  GLUCOPHAGE Take 1 tab in the morning and 2 tabs at dinner  
  
 phentermine 37.5 mg tablet Commonly known as:  ADIPEX-P Take 1 tablet before breakfast  
  
 pravastatin 20 mg tablet Commonly known as:  PRAVACHOL  
TAKE 1 TABLET BY MOUTH ONCE DAILY  
  
 VITAMIN D3 1,000 unit tablet Generic drug:  cholecalciferol Take  by mouth daily. Prescriptions Printed Refills  
 phentermine (ADIPEX-P) 37.5 mg tablet 1 Sig: Take 1 tablet before breakfast  
 Class: Print Prescriptions Sent to Pharmacy Refills  
 pravastatin (PRAVACHOL) 20 mg tablet 3 Sig: TAKE 1 TABLET BY MOUTH ONCE DAILY Class: Normal  
 [facility] [address] [place] [address] Ph #: [phone]  
 metFORMIN (GLUCOPHAGE) 500 mg tablet 3 Sig: Take 1 tab in the morning and 2 tabs at dinner Class: Normal  
 Pharmacy: [facility] [address] [place] [address] Elyssa Yanes Ph #: [phone]  
 lisinopril (PRINIVIL, ZESTRIL) 20 mg tablet 3 Sig: TAKE ONE TABLET BY MOUTH ONCE DAILY. Class: Normal  
 Pharmacy: [facility] [place] [address] Elyssa Yanes Ph #: [phone] We Performed the Following AMB POC HEMOGLOBIN A1C [96418 CPT(R)] COLLECTION VENOUS BLOOD,VENIPUNCTURE [id number] CPT(R)] FERRITIN [40020 CPT(R)] IRON PROFILE [id number] CPT(R)] LIPID PANEL [14251 CPT(R)] METABOLIC PANEL, COMPREHENSIVE [41292 CPT(R)] DE HANDLG&/OR CONVEY OF SPEC FOR TR OFFICE TO LAB [71849 CPT(R)] TSH 3RD GENERATION [35059 CPT(R)] VITAMIN D, 25 HYDROXY [id number] CPT(R)] Follow-up Instructions Return in about 6 months (around 8/22/2018). Patient Instructions 1) Try decreasing the metformin to 1/2 tab of 1000 mg in the morning and 1 tab at night for the next week and see if this helps with any looser stools and gas pains. If symptoms still persist after one week, then try taking 1/2 tab twice daily. I will send the 500 mg tabs to the pharmacy so you can use these after using the up the 1000 mg tabs. 2) Stay on the same dose of trulicity for now. If you are still having side effects despite cutting back on the metformin to 1/2 tab twice daily, let me know. 3) I will send the pravastatin 20 mg tabs to the pharmacy so you won't cut these in half. Introducing [facility]! Dear [place]: 
Thank you for requesting a [employer] account. Our records indicate that you already have an active [employer] account. You can access your account anytime at [email or web address] Did you know that you can access your hospital and ER discharge instructions at any time in [employer]? You can also review all of your test results from your hospital stay or ER visit. Additional Information If you have questions, please visit the Frequently Asked Questions section of the [employer] website at [email or web address] Remember, [employer] is NOT to be used for urgent needs. For medical emergencies, dial 911. Now available from your iPhone and Android! Please provide this summary of care documentation to your next provider. Your primary care clinician is listed as ANDREA Yanes. If you have any questions after today's visit, please call [phone].

## 2018-02-22 NOTE — PATIENT INSTRUCTIONS
1) Try decreasing the metformin to 1/2 tab of 1000 mg in the morning and 1 tab at night for the next week and see if this helps with any looser stools and gas pains. If symptoms still persist after one week, then try taking 1/2 tab twice daily. I will send the 500 mg tabs to the pharmacy so you can use these after using the up the 1000 mg tabs. 2) Stay on the same dose of trulicity for now. If you are still having side effects despite cutting back on the metformin to 1/2 tab twice daily, let me know. 3) I will send the pravastatin 20 mg tabs to the pharmacy so you won't cut these in half.

## 2018-02-22 NOTE — PROGRESS NOTES
Chief Complaint   Patient presents with    Diabetes     pcp and pharmacy confirmed     History of Present Illness: [place] is a 58 y.o. female here for follow up of diabetes. Weight down 2 lbs since last visit in 6/17. Still only taking about 4-6 units of humalog with meals. Increased the lantus up to 42 units daily as she has been eating more junk. Taking the trulicity every week but states she feels more dry than she used to and has had more itching and some hair loss and thinks this may be from the trulicity. Stopped the biotin awhile ago and was having nausea with this. Has been taking the 1/2 tab of pravastatin and this has helped some with her memory. Stopped the lopid after last visit and has had improvement in muscle aches and cramps off the combination. Does feel like she has gas pains and looser stools. Has seen some readings in the 80s and won't take any humalog. If around 120, will only take 3 units but this morning was over 200 and took 7 units but had eaten a big dinner. Still taking phentermine. Current Outpatient Prescriptions   Medication Sig    phentermine (ADIPEX-P) 37.5 mg tablet Take 1 tablet before breakfast    b complex vitamins tablet Take 1 Tab by mouth daily.  insulin lispro (HUMALOG KWIKPEN) 100 unit/mL kwikpen 4 units with breakfast and 6 units with lunch and dinner. Max 45 units per day--Dose change 6/22/17--updated med list--did not send prescription to the pharmacy    insulin glargine (LANTUS,BASAGLAR) 100 unit/mL (3 mL) inpn 35-40 Units by SubCUTAneous route nightly. Dose change 6/22/17--updated med list--did not send prescription to the pharmacy    dulaglutide (TRULICITY) 2.83 FW/8.3 mL sub-q pen 0.5 mL by SubCUTAneous route every seven (7) days.  FREESTYLE LITE STRIPS strip Test 4 times daily--Dx: E11.65--90 [place] supply [place]    cholecalciferol (VITAMIN D3) 1,000 unit tablet Take  by mouth daily.     metFORMIN (GLUCOPHAGE) 1,000 mg tablet TAKE ONE TABLET BY MOUTH TWICE DAILY WITH MEALS    lisinopril (PRINIVIL, ZESTRIL) 20 mg tablet TAKE ONE TABLET BY MOUTH ONCE DAILY.  pravastatin (PRAVACHOL) 40 mg tablet TAKE 1/2 TABLET BY MOUTH ONCE DAILY--Dose change 1/23/17--updated med list--did not send prescription to the pharmacy    FREESTYLE LITE METER monitoring kit Test 4 times daily--Dx: E11.65    coenzyme q10 (CO Q-10) 100 mg Cap Take 100 mg by mouth daily.  aspirin 81 mg tablet Take 81 mg by mouth daily. No current facility-administered medications for this visit. Allergies   Allergen Reactions    Carrot Swelling    Apple Swelling     Apple skin causes her lips to swell    Gemfibrozil Other (comments)     Back and flank pain that has improved with stopping when taking in combination with pravastatin    Simvastatin Myalgia     Review of Systems:  - Eyes: no blurry vision or double vision  - Cardiovascular: no chest pain  - Respiratory: no shortness of breath  - Musculoskeletal: no myalgias  - Neurological: no numbness/tingling in extremities    Physical Examination:  Blood pressure 120/61, pulse 85, height 5' 1\" (1.549 m), weight 150 lb 3.2 oz (68.1 kg), last menstrual period 06/28/2010.  - General: pleasant, no distress, good eye contact   - Neck: no carotid bruits  - Cardiovascular: regular, normal rate, nl s1 and s2, no m/r/g,   - Respiratory: clear bilaterally  - Integumentary: no edema,   - Psychiatric: normal mood and affect    Data Reviewed:   Component      Latest Ref Rng & Units 2/22/2018           9:57 AM   Hemoglobin A1c (POC)      % 7.1       Assessment/Plan:     1.  DM w/o complication type II, uncontrolled (250.02) her most recent Hgb A1c was 7.1% in 2/18 up from 6.6% in 6/17 down from 7.9% in 1/17 up from 7.8% in 5/16 down from 8.9% in 2/16 up from 8.1% in 5/15 up from 7.5% in 1/15 down from 8.1% in 7/14 up from 7.1% in 4/14 in [facility] down from 7.2% in 11/13 up from 6.9% in 8/13 down from 7.3% in 4/13 up from 6.9% in Nov up from 6.2% in August down from 6.5% in April up from 6.4% in December down from 6.9% in May 2011 down from 7.9% in November 2010 down from 9.2% in March 2010 prior to starting insulin. A1c is slightly higher but hasn't been watching diet as closely. Unclear if any of her symptoms are from trulicity or from the combo of this with metformin so will cut back on metformin. - cont Lantus 35-40 units at bedtime     - cont Humalog 4-6 units with meals  - decrease Metformin to 500 mg in am and 1g in pm  - cont trulicity 7.49 mg weekly  - foot exam done 6/17  - optho UTD 4/17  - microalbumin 153 11/10 down to 30.1 in 1/12, up to 58 in 4/13 (increased lis to 20 at that time) and down to 25 in 8/13, up to 47 in 5/15 and stable at 47 in 5/16 and 43 in 6/17  - check bs 3-4x day       2. Unspecified essential hypertension (401.9) her BP was at goal < 140/90   - cont lisinopril 20 mg daily       3. Other and unspecified hyperlipidemia (272.4) Given DM, Goal LDL < 100, non-HDL < 130, and TG < 150. Myalgias with simvastatin. Was changed from gemfibrozil to pravastatin in May 2012.  with non-HDL of 149 at that time off therapy down to 62 and 126 in August. TGs > 500 in 11/12 and down to 353 in 4/13 but up to 741 in 8/13 so restarted gemfibrozil at that time. TG down to 285 in 11/13. Up to 343 in 7/14. Down to 183 in 1/15. LDL 85 and  in 5/15.  and  in 2/16. LDL 65 and  in 5/16 with lower A1c. LDL 87 and  in 1/17. LDL 72 and  in 6/17. Having some memory loss so decreased dose to 1/2 tab daily and stopped lopid due to myalgias and both symptoms are better. - cont prava 20 mg daily  - check lipids and cmp today       4. Unspecified vitamin D deficiency (268.9) Level was 19 in November 2010 on 2000 units daily so increased to 4000 units daily and it was up to 26.3 in May 2011.   On 5000 units daily her level was 24 in January 2012 so I increased her to 10,000 units daily at that time and level was 68 in April 2012 and 62 in August and 72 in November and 69 in 4/13 so decreased her dose to 5000 units at that time and level 40 in 8/13. Calcium level was up in 11/13 to 11.0 so stopped vitamin D at that time and level 32 in 5/15. Down to 24 in 2/16 and 28 in 1/17. Restarted 1000 units daily and up to 31.5 in 6/17  - cont vitamin D 1000 units daily  - check Vitamin D 25-OH level today       5. Hypercalcemia: Had a level of 10.4 in 1/12 and no other abnormal values until 10.5 in 8/13 and up to 11 in 11/13. Stopped vitamin D and mvi at that time. Down to 10 in 6/14. Found to have a 2 mm stone on CT scan in [facility] in 4/14. Will hold on further evaluation given she doesn't have insurance at this time but will plan on drawing a PTH level in the future. Repeat calcium was 11 in 1/15 and 10.4 in 5/15. Up to 10.9 in 2/16 but down to 10.2 in 5/16 and in 1/17 and 9.7 in 6/17.  - follow on cmp    6. Obesity: weight down 8 lbs from 5/15 to 2/16 and 2 lbs by 5/16 but only taking 1/2 tab in am consistently and down 5 lbs by 1/17. Wt up 1 lb by 6/17 and down 2 lbs by 2/18  - cont phentermine 37.5 mg 1 whole tab daily  - trulicity as above    7. Hair loss:  - check TSH, cbc w/o diff and iron and ferritin today  - may need dermatology referral      Patient Instructions   1) Try decreasing the metformin to 1/2 tab of 1000 mg in the morning and 1 tab at night for the next week and see if this helps with any looser stools and gas pains. If symptoms still persist after one week, then try taking 1/2 tab twice daily. I will send the 500 mg tabs to the pharmacy so you can use these after using the up the 1000 mg tabs. 2) Stay on the same dose of trulicity for now. If you are still having side effects despite cutting back on the metformin to 1/2 tab twice daily, let me know.     3) I will send the pravastatin 20 mg tabs to the pharmacy so you won't cut these in half.                Follow-up Disposition:  Return in about 6 months (around 8/22/2018). Copy sent to:  Dr. Chente Mcginnis via [facility]    Lab follow up: 2/24/18  Component      Latest Ref Rng & Units 2/22/2018 2/22/2018 2/22/2018          10:19 AM 10:19 AM 10:19 AM   TIBC      250 - 450 ug/dL  284    UIBC      118 - 369 ug/dL  228    Iron      27 - 139 ug/dL  56    Iron % saturation      15 - 55 %  20    TSH      0.450 - 4.500 uIU/mL   1.760   Ferritin      15 - 150 ng/mL 98       Component      Latest Ref Rng & Units 2/22/2018 2/22/2018 2/22/2018          10:19 AM 10:19 AM 10:19 AM   Glucose      65 - 99 mg/dL  218 (H)    BUN      8 - 27 mg/dL  13    Creatinine      0.57 - 1.00 mg/dL  0.54 (L)    GFR est non-AA      >59 mL/min/1.73  101    GFR est AA      >59 mL/min/1.73  117    BUN/Creatinine ratio      12 - 28  24    Sodium      134 - 144 mmol/L  137    Potassium      3.5 - 5.2 mmol/L  3.9    Chloride      96 - 106 mmol/L  101    CO2      18 - 29 mmol/L  16 (L)    Calcium      8.7 - 10.3 mg/dL  9.2    Protein, total      6.0 - 8.5 g/dL  7.2    Albumin      3.6 - 4.8 g/dL  3.8    GLOBULIN, TOTAL      1.5 - 4.5 g/dL  3.4    A-G Ratio      1.2 - 2.2  1.1 (L)    Bilirubin, total      0.0 - 1.2 mg/dL  <0.2    Alk. phosphatase      39 - 117 IU/L  127 (H)    AST      0 - 40 IU/L  44 (H)    ALT (SGPT)      0 - 32 IU/L  64 (H)    Cholesterol, total      100 - 199 mg/dL 291 (H)     Triglyceride      0 - 149 mg/dL 2720 (HH)     HDL Cholesterol      >39 mg/dL 18 (L)     VLDL, calculated      5 - 40 mg/dL Comment     LDL, calculated      0 - 99 mg/dL Comment     VITAMIN D, 25-HYDROXY      ng/mL   CANCELED     Sent her the following message through [employer]:    Hemoglobin A1c is a 3 month marker of your diabetes control. Goal is less than 7% which means your average blood sugar is less than 150. Your Hemoglobin A1c is 7.1% which means your diabetes is under slightly worse control than 6.6% at your last check.   Continue to work on your diet and exercise and take all your medications as directed.  -------------------------------------------------------------------------------------------------------------------  Total Cholesterol is the total number of cholesterol particles in your blood. Goal is less than 200. Your value is far from goal.    Triglycerides are the short term fats in your blood. Goal is less than 150. Your value is very elevated off the gemfibrozil. HDL is the good cholesterol in your blood. Goal is more than 50. Your value is below goal.    LDL is the bad cholesterol in your blood. Goal is less than 100. Your value is unable to be calculated due to the high triglyceride level. Given how high your cholesterol has risen off the gemfibrozil, I think we should restart this at 1 tab twice daily but stay on the lower dose of pravastatin 20 mg daily for now. This will be ready for  at the pharmacy today. Continue to follow a low cholesterol diet. Try to limit the amount of fried foods, fatty foods, butter, gravy, red meat, ice cream, cheese, and eggs in your diet, which are all high in cholesterol. Take all of your medications (pravastatin and gemfibrozil) as directed.  -------------------------------------------------------------------------------------------------------------------  BUN and creatinine are markers of kidney function. Your values are normal. Although your creatinine is low, I'm not concerned about this as I only worry if your creatinine is high.  -------------------------------------------------------------------------------------------------------------------  ALT and AST are markers of liver function.   Your values are elevated but because your triglycerides are so high this may falsely raise these levels so we'll see if they come back down with being back on the gemfibrozil.  -------------------------------------------------------------------------------------------------------------------  TSH is a thyroid test.  Your level is normal so you don't have any problem with your thyroid at this time that needs further evaluation or treatment.   -------------------------------------------------------------------------------------------------------------------  Your iron and Ferritin (marker of iron storage) are both normal so you don't have anemia as a cause of hair loss.  -------------------------------------------------------------------------------------------------------------------  Your vitamin D level was cancelled because your triglycerides were so high that they could not run this specimen. Previously your level was normal in 6/17 on 1000 units per day. If you would like to try increasing to 2000 units per day to see if this helps with hair loss then I think this is reasonable. As we discussed, if you continue to have hair loss, you should plan on seeing a dermatologist for any other evaluation and treatment options.

## 2018-02-23 LAB
25(OH)D3+25(OH)D2 SERPL-MCNC: NORMAL NG/ML
ALBUMIN SERPL-MCNC: 3.8 G/DL (ref 3.6–4.8)
ALBUMIN/GLOB SERPL: 1.1 {RATIO} (ref 1.2–2.2)
ALP SERPL-CCNC: 127 IU/L (ref 39–117)
ALT SERPL-CCNC: 64 IU/L (ref 0–32)
AST SERPL-CCNC: 44 IU/L (ref 0–40)
BILIRUB SERPL-MCNC: <0.2 MG/DL (ref 0–1.2)
BUN SERPL-MCNC: 13 MG/DL (ref 8–27)
BUN/CREAT SERPL: 24 (ref 12–28)
CALCIUM SERPL-MCNC: 9.2 MG/DL (ref 8.7–10.3)
CHLORIDE SERPL-SCNC: 101 MMOL/L (ref 96–106)
CHOLEST SERPL-MCNC: 291 MG/DL (ref 100–199)
CO2 SERPL-SCNC: 16 MMOL/L (ref 18–29)
CREAT SERPL-MCNC: 0.54 MG/DL (ref 0.57–1)
FERRITIN SERPL-MCNC: 98 NG/ML (ref 15–150)
GFR SERPLBLD CREATININE-BSD FMLA CKD-EPI: 101 ML/MIN/1.73
GFR SERPLBLD CREATININE-BSD FMLA CKD-EPI: 117 ML/MIN/1.73
GLOBULIN SER CALC-MCNC: 3.4 G/DL (ref 1.5–4.5)
GLUCOSE SERPL-MCNC: 218 MG/DL (ref 65–99)
HDLC SERPL-MCNC: 18 MG/DL
INTERPRETATION, 910389: NORMAL
IRON SATN MFR SERPL: 20 % (ref 15–55)
IRON SERPL-MCNC: 56 UG/DL (ref 27–139)
LDLC SERPL CALC-MCNC: ABNORMAL MG/DL (ref 0–99)
Lab: NORMAL
POTASSIUM SERPL-SCNC: 3.9 MMOL/L (ref 3.5–5.2)
PROT SERPL-MCNC: 7.2 G/DL (ref 6–8.5)
SODIUM SERPL-SCNC: 137 MMOL/L (ref 134–144)
TIBC SERPL-MCNC: 284 UG/DL (ref 250–450)
TRIGL SERPL-MCNC: 2720 MG/DL (ref 0–149)
TSH SERPL DL<=0.005 MIU/L-ACNC: 1.76 UIU/ML (ref 0.45–4.5)
UIBC SERPL-MCNC: 228 UG/DL (ref 118–369)
VLDLC SERPL CALC-MCNC: ABNORMAL MG/DL (ref 5–40)

## 2018-02-24 RX ORDER — GEMFIBROZIL 600 MG/1
600 TABLET, FILM COATED ORAL 2 TIMES DAILY
Qty: 180 TAB | Refills: 3 | Status: SHIPPED | OUTPATIENT
Start: 2018-02-24 | End: 2018-03-05 | Stop reason: SDUPTHER

## 2018-02-27 ENCOUNTER — TELEPHONE (OUTPATIENT)
Dept: ENDOCRINOLOGY | Age: 63
End: 2018-02-27

## 2018-02-27 NOTE — TELEPHONE ENCOUNTER
Please let her know previously the pain she had was while taking a higher dose of pravastatin and now that she is on a lower dose, I'm hopeful she will not have the pain and would like her to try this one more time. If the pain returns, then have her let me know.

## 2018-02-27 NOTE — TELEPHONE ENCOUNTER
Pt notified of message per Dr. James Godwin and voiced understanding of what was read to her. She stated that she cannot take the gemfibrozil due to the muscle pain she had. She stated that she would like to take something else.

## 2018-02-27 NOTE — TELEPHONE ENCOUNTER
Please call pt to let her know she has an unread message in [address] Hemoglobin A1c is a 3 month marker of your diabetes control.  Goal is less than 7% which means your average blood sugar is less than 150. Your Hemoglobin A1c is 7.1% which means your diabetes is under slightly worse control than 6.6% at your last check.  Continue to work on your diet and exercise and take all your medications as directed.   -------------------------------------------------------------------------------------------------------------------   Total Cholesterol is the total number of cholesterol particles in your blood.  Goal is less than 200.  Your value is far from goal.     Triglycerides are the short term fats in your blood.  Goal is less than 150.  Your value is very elevated off the gemfibrozil. HDL is the good cholesterol in your blood.  Goal is more than 50.  Your value is below goal.     LDL is the bad cholesterol in your blood.  Goal is less than 100.  Your value is unable to be calculated due to the high triglyceride level.       Given how high your cholesterol has risen off the gemfibrozil, I think we should restart this at 1 tab twice daily but stay on the lower dose of pravastatin 20 mg daily for now.  This will be ready for  at the pharmacy today. Continue to follow a low cholesterol diet.  Try to limit the amount of fried foods, fatty foods, butter, gravy, red meat, ice cream, cheese, and eggs in your diet, which are all high in cholesterol. Take all of your medications (pravastatin and gemfibrozil) as directed.   -------------------------------------------------------------------------------------------------------------------   BUN and creatinine are markers of kidney function.  Your values are normal. Although your creatinine is low, I'm not concerned about this as I only worry if your creatinine is high. -------------------------------------------------------------------------------------------------------------------   ALT and AST are markers of liver function.  Your values are elevated but because your triglycerides are so high this may falsely raise these levels so we'll see if they come back down with being back on the gemfibrozil.   -------------------------------------------------------------------------------------------------------------------   TSH is a thyroid test.  Your level is normal so you don't have any problem with your thyroid at this time that needs further evaluation or treatment.   -------------------------------------------------------------------------------------------------------------------   Your iron and Ferritin (marker of iron storage) are both normal so you don't have anemia as a cause of hair loss.   -------------------------------------------------------------------------------------------------------------------   Your vitamin D level was cancelled because your triglycerides were so high that they could not run this specimen. Previously your level was normal in 6/17 on 1000 units per day.  If you would like to try increasing to 2000 units per day to see if this helps with hair loss then I think this is reasonable.  As we discussed, if you continue to have hair loss, you should plan on seeing a dermatologist for any other evaluation and treatment options.

## 2018-03-01 ENCOUNTER — OFFICE VISIT (OUTPATIENT)
Dept: INTERNAL MEDICINE CLINIC | Age: 63
End: 2018-03-01

## 2018-03-01 VITALS
DIASTOLIC BLOOD PRESSURE: 70 MMHG | OXYGEN SATURATION: 96 % | RESPIRATION RATE: 16 BRPM | TEMPERATURE: 97.9 F | HEART RATE: 76 BPM | HEIGHT: 61 IN | SYSTOLIC BLOOD PRESSURE: 123 MMHG | WEIGHT: 149 LBS | BODY MASS INDEX: 28.13 KG/M2

## 2018-03-01 DIAGNOSIS — Z23 ENCOUNTER FOR IMMUNIZATION: ICD-10-CM

## 2018-03-01 DIAGNOSIS — L65.9 HAIR LOSS: Primary | ICD-10-CM

## 2018-03-01 DIAGNOSIS — J40 BRONCHITIS: ICD-10-CM

## 2018-03-01 DIAGNOSIS — L85.3 DRY SKIN: ICD-10-CM

## 2018-03-01 RX ORDER — AZITHROMYCIN 250 MG/1
250 TABLET, FILM COATED ORAL SEE ADMIN INSTRUCTIONS
Qty: 6 TAB | Refills: 0 | Status: SHIPPED | OUTPATIENT
Start: 2018-03-01 | End: 2019-01-29 | Stop reason: ALTCHOICE

## 2018-03-01 NOTE — PROGRESS NOTES
CC: Establish Care; Cough; Diabetes; Medication Problem (Trulicity causing hair loss); and Elbow Pain (right )      HPI:    She is a 58 y.o. female who presents for evaluation of multiple complaints. She is concerned about medication side effects. She recently saw her endocrinologist ( Dr Mendez). I reviewed his note. Triglycerides are very high - restarted on lopid. Recall that developed pain on back and sides which had resolved after stopping gemfibrozil . I encouraged patient to give Lopid a try as her triglycerides are very elevated. Today denies pain. Diabetes: This is well controlled on Trulicity with metformin and insulin. Reports having GI issues, now taking 500mg in the morning and 1000mg at night  Going to restroom 3 times a day,\" it is explosive and bothersome to patient\"  -Patient states I would like to be on less medications. I discussed with her that at this point she should continue with the medications to maintain control of her diabetes. Discussed that if she loses 20 pounds she may be able to decrease the amount of diabetes medications. Patient wants to decrease metformin to 500 mg twice a day and see if her GI side effects improved. Hair loss: Complains of hair loss ongoing for several years but patient feels that it is more pronounced in the past year. She attributes it to possibly to the city. Discussed with patient that hair loss is not listed as a side effect elicited. She is also under an enormous amount of stress. She had her iron level and thyroid checked by Dr. Mendez and these were within normal limits. I am going to give the patient referral to dermatology    Patient also complains of cough productive of green sputum and sore throat. Also notes some decreased voice,hoarseness. Symptoms for 1 week. No fever no chills.     HM  Pap smear up to date     Mammogram scheduled April 4th     Fit testing done in 2017 and negative -encouraged to schedule colonoscopy    ROS:  10 systems reviewed and negative other than HPI    Past Medical History:   Diagnosis Date    Chest pain 2012    Saw Dr. Shayne Chavira, negative stress test    Diabetes [facility])     Diverticulosis     seen on CT scan    Hyperlipidemia     Hypertension     Obesity     Sensory neuropathy 2012    Tingling L toe    Tendonitis, Achilles, left 2012    Type II or unspecified type diabetes mellitus without mention of complication, uncontrolled     Vitamin D deficiency        Current Outpatient Prescriptions on File Prior to Visit   Medication Sig Dispense Refill    gemfibrozil (LOPID) 600 mg tablet Take 1 Tab by mouth two (2) times a day. 180 Tab 3    pravastatin (PRAVACHOL) 20 mg tablet TAKE 1 TABLET BY MOUTH ONCE DAILY 90 Tab 3    metFORMIN (GLUCOPHAGE) 500 mg tablet Take 1 tab in the morning and 2 tabs at dinner 270 Tab 3    lisinopril (PRINIVIL, ZESTRIL) 20 mg tablet TAKE ONE TABLET BY MOUTH ONCE DAILY. 90 Tab 3    phentermine (ADIPEX-P) 37.5 mg tablet Take 1 tablet before breakfast 100 Tab 1    b complex vitamins tablet Take 1 Tab by mouth daily.  insulin lispro (HUMALOG KWIKPEN) 100 unit/mL kwikpen 4 units with breakfast and 6 units with lunch and dinner. Max 45 units per day--Dose change 6/22/17--updated med list--did not send prescription to the pharmacy (Patient taking differently: 4 units with breakfast and 6 units with lunch and dinner. Max 45 units per day--Dose change 6/22/17--updated med list--did not send prescription to the pharmacy  Indications: Sliding scale) 60 mL 3    insulin glargine (LANTUS,BASAGLAR) 100 unit/mL (3 mL) inpn 38-42 Units by SubCUTAneous route nightly. Dose change 6/22/17--updated med list--did not send prescription to the pharmacy      dulaglutide (TRULICITY) 4.01 IK/0.1 mL sub-q pen 0.5 mL by SubCUTAneous route every seven (7) days.  16 Syringe 3    FREESTYLE LITE STRIPS strip Test 4 times daily--Dx: E11.65--[place] 400 Strip 3    cholecalciferol (VITAMIN D3) 1,000 unit tablet Take 2,000 Units by mouth daily.  FREESTYLE LITE METER monitoring kit Test 4 times daily--Dx: E11.65 1 Kit 0    coenzyme q10 (CO Q-10) 100 mg Cap Take 100 mg by mouth daily.  aspirin 81 mg tablet Take 81 mg by mouth daily. No current facility-administered medications on file prior to visit. Past Surgical History:   Procedure Laterality Date    TYMPANOPLASTY      left       Family History   Problem Relation Age of Onset    Diabetes Father     Heart Disease Father 46     MI    Diabetes Brother     Diabetes Other      multiple family members on father's side    Diabetes Brother     Diabetes Brother     Stroke Neg Hx      Reviewed and no changes     Social History     Social History    Marital status:      Spouse name: N/A    Number of children: N/A    Years of education: N/A     Occupational History    Not on file. Social History Main Topics    Smoking status: Former Smoker     Packs/day: 0.50     Years: 20.00     Quit date: 11/3/2004    Smokeless tobacco: Never Used    Alcohol use No    Drug use: No    Sexual activity: Yes     Partners: Male     Other Topics Concern    Not on file     Social History Narrative    Lives in [place] with  of 10 years. Has a 31 yo daughter from a previous marriage. Used to work as a  for [employer] and a [employer] in [place] and for [employer]. Likes to read, cook, and shop.              Visit Vitals    /70 (BP 1 Location: Left arm, BP Patient Position: Sitting)    Pulse 76    Temp 97.9 °F (36.6 °C) (Oral)    Resp 16    Ht 5' 1\" (1.549 m)    Wt 149 lb (67.6 kg)    LMP 06/28/2010    SpO2 96%    BMI 28.15 kg/m2       Physical Examination:   General - Well appearing female  HEENT - PERRL, TM no erythema/opacification, normal nasal turbinates, oropharynx no erythema or exudate, MMM  Neck - supple, no bruits, no TMG, no LAD  Pulm - clear to auscultation bilaterally  Cardio - RRR, normal S1 S2, no murmur gallops or rubs  Abd - soft, nontender, no masses, no HSM  Extrem - no edema, +2 distal pulses  Psych - normal affect, appropriate mood  Diffuse hair loss noted. No single patch identified. Hair loss is diffuse.   Lab Results   Component Value Date/Time    WBC 8.0 01/23/2017 04:59 PM    HGB 14.2 01/23/2017 04:59 PM    HCT 41.9 01/23/2017 04:59 PM    PLATELET 817 79/92/7451 04:59 PM    MCV 89 01/23/2017 04:59 PM     Lab Results   Component Value Date/Time    Sodium 137 02/22/2018 10:19 AM    Potassium 3.9 02/22/2018 10:19 AM    Chloride 101 02/22/2018 10:19 AM    CO2 16 (L) 02/22/2018 10:19 AM    Anion gap 10 07/03/2015 01:20 AM    Glucose 218 (H) 02/22/2018 10:19 AM    BUN 13 02/22/2018 10:19 AM    Creatinine 0.54 (L) 02/22/2018 10:19 AM    BUN/Creatinine ratio 24 02/22/2018 10:19 AM    GFR est  02/22/2018 10:19 AM    GFR est non- 02/22/2018 10:19 AM    Calcium 9.2 02/22/2018 10:19 AM     Lab Results   Component Value Date/Time    Cholesterol, total 291 (H) 02/22/2018 10:19 AM    HDL Cholesterol 18 (L) 02/22/2018 10:19 AM    LDL,Direct 102 (H) 11/11/2013 03:25 PM    LDL, calculated Comment 02/22/2018 10:19 AM    VLDL, calculated Comment 02/22/2018 10:19 AM    Triglyceride 2720 (HH) 02/22/2018 10:19 AM    CHOL/HDL Ratio 5.5 (H) 11/03/2010 10:07 AM     Lab Results   Component Value Date/Time    TSH 1.760 02/22/2018 10:19 AM     No results found for: PSA, PSA2, PSAR1, Varsha Benavides, PSAR3, [id number], [id number], PSALT  Lab Results   Component Value Date/Time    Hemoglobin A1c 7.8 (H) 05/09/2016 02:00 PM    Hemoglobin A1c (POC) 7.1 02/22/2018 09:57 AM     Lab Results   Component Value Date/Time    Vitamin D 25-Hydroxy 26.3 (L) 05/17/2011 02:41 PM    VITAMIN D, 25-HYDROXY CANCELED 02/22/2018 10:19 AM       Lab Results   Component Value Date/Time    ALT (SGPT) 64 (H) 02/22/2018 10:19 AM    AST (SGOT) 44 (H) 02/22/2018 10:19 AM    Alk. phosphatase 127 (H) 02/22/2018 10:19 AM    Bilirubin, total <0.2 02/22/2018 10:19 AM           Assessment/Plan:    26-year-old woman with a history of elevated triglycerides diabetes presenting to follow-up on chronic medical problems    1. Hair loss  -Patient has had a thyroid checked which was normal, ferritin and iron normal.  - REFERRAL TO DERMATOLOGY    2. Diabetes: controlled. Encouraged to continue regimen per Dr Michaela Clifford. Patient wants to decrease dose of metformin to 500mg BID to see if GI complaints improve. - on trulicity, insulin and metformin  - encouraged to continue working on weight loss      3. Bronchitis  -Suspect viral advised patient to rest drink fluids, if symptoms do not improve in the next 3-4 days and she may take a Z-Matheus  - azithromycin (ZITHROMAX) 250 mg tablet; Take 1 Tab by mouth See Admin Instructions for 6 doses. Take 2 tabs on day one followed by 1 tab daily for a total of 5 days. Dispense: 6 Tab; Refill: 0  - taking zyrtec for congestion    4. Elevated cholesterol with marked hypertriglyceridemia: on pravastatin and lopid. Lopid recently resumed. Counseled on diet today    In addition prevnar was given     - PNEUMOCOCCAL CONJ VACCINE 13 VALENT IM  - [place] IMMUNIZ ADMIN,1 SINGLE/COMB VAC/TOXOID    Follow-up Disposition:  Return in about 6 months (around 9/1/2018) for obesity.     MD Mateo

## 2018-03-01 NOTE — PATIENT INSTRUCTIONS
Body Mass Index: Care Instructions  Your Care Instructions    Body mass index (BMI) can help you see if your weight is raising your risk for health problems. It uses a formula to compare how much you weigh with how tall you are. · A BMI lower than 18.5 is considered underweight. · A BMI between 18.5 and 24.9 is considered healthy. · A BMI between 25 and 29.9 is considered overweight. A BMI of 30 or higher is considered obese. If your BMI is in the normal range, it means that you have a lower risk for weight-related health problems. If your BMI is in the overweight or obese range, you may be at increased risk for weight-related health problems, such as high blood pressure, heart disease, stroke, arthritis or joint pain, and diabetes. If your BMI is in the underweight range, you may be at increased risk for health problems such as fatigue, lower protection (immunity) against illness, muscle loss, bone loss, hair loss, and hormone problems. BMI is just one measure of your risk for weight-related health problems. You may be at higher risk for health problems if you are not active, you eat an unhealthy diet, or you drink too much alcohol or use tobacco products. Follow-up care is a key part of your treatment and safety. Be sure to make and go to all appointments, and call your doctor if you are having problems. It's also a good idea to know your test results and keep a list of the medicines you take. How can you care for yourself at home? · Practice healthy eating habits. This includes eating plenty of fruits, vegetables, whole grains, lean protein, and low-fat dairy. · If your doctor recommends it, get more exercise. Walking is a good choice. Bit by bit, increase the amount you walk every day. Try for at least 30 minutes on most days of the week. · Do not smoke. Smoking can increase your risk for health problems. If you need help quitting, talk to your doctor about stop-smoking programs and medicines. These can increase your chances of quitting for good. · Limit alcohol to 2 drinks a day for men and 1 drink a day for women. Too much alcohol can cause health problems. If you have a BMI higher than 25  · Your doctor may do other tests to check your risk for weight-related health problems. This may include measuring the distance around your waist. A waist measurement of more than 40 inches in men or 35 inches in women can increase the risk of weight-related health problems. · Talk with your doctor about steps you can take to stay healthy or improve your health. You may need to make lifestyle changes to lose weight and stay healthy, such as changing your diet and getting regular exercise. If you have a BMI lower than 18.5  · Your doctor may do other tests to check your risk for health problems. · Talk with your doctor about steps you can take to stay healthy or improve your health. You may need to make lifestyle changes to gain or maintain weight and stay healthy, such as getting more healthy foods in your diet and doing exercises to build muscle. Where can you learn more? Go to http://kerrie-bob.info/. Enter S176 in the search box to learn more about \"Body Mass Index: Care Instructions. \"  Current as of: October 13, 2016  Content Version: 11.4  © 8201-2912 Healthwise, [employer]. Care instructions adapted under license by [employer] (which disclaims liability or warranty for this information).  If you have questions about a medical condition or this instruction, always ask your healthcare professional. [place] [address] any warranty or liability for your use of this information.    ------------------------------------  Ideally schedule colonoscopy - as you must do this to screen for colon cancer

## 2018-03-01 NOTE — MR AVS SNAPSHOT
[address] [place] [facility] 
[phone] Patient: [place] MRN: [id number] GGM:[id number] Visit Information Date & Time Provider Department Dept. Phone Encounter #  
 3/1/2018 11:00 AM Lito Rice, [address] [place] [address],[address] [phone] [id number] Your Appointments 8/2/2018  9:30 AM  
Follow Up with MD Antonio Finnmond [facility] Alisa Gomez) Appt Note: f/u             dm            6 month  
 [address] [id number] [address] [address]  
  
    
  
 4/4/2018 12:20 PM  
EWL OVER 50 with RYLIE Busch [facility] ([address]) [address] [facility] [address] [place] [address]  
[phone]  
  
   
 [address] [facility]   [facility] [address] [facility] Upcoming Health Maintenance Date Due Hepatitis C Screening 1955 Pneumococcal 19-64 Medium Risk (1 of 1 - PPSV23) 5/1/1974 DTaP/Tdap/Td series (1 - Tdap) 5/1/1976 ZOSTER VACCINE AGE 60> 3/1/2015 PAP AKA CERVICAL CYTOLOGY 1/10/2018 EYE EXAM RETINAL OR DILATED Q1 4/24/2018 FOOT EXAM Q1 6/23/2018 MICROALBUMIN Q1 6/23/2018 FOBT Q 1 YEAR, 18+ 6/23/2018 HEMOGLOBIN A1C Q6M 8/22/2018 LIPID PANEL Q1 2/22/2019 BREAST CANCER SCRN MAMMOGRAM 3/28/2019 Allergies as of 3/1/2018  Review Complete On: 3/1/2018 By: Mirna Moise LPN Severity Noted Reaction Type Reactions Carrot Medium 11/03/2010   Systemic Swelling Apple  02/01/2017    Swelling Apple skin causes her lips to swell Gemfibrozil  07/10/2017    Other (comments) Back and flank pain that has improved with stopping when taking in combination with pravastatin Simvastatin  07/22/2011    Myalgia Current Immunizations  Reviewed on 3/1/2018 Name Date Pneumococcal Conjugate (PCV-13)  Incomplete Reviewed by Lisa Saucedo MD on 3/1/2018 at 11:52 AM  
You Were Diagnosed With   
  
 Codes Comments Hair loss    -  Primary ICD-10-CM: L65.9 ICD-9-CM: 704.00 Dry skin     ICD-10-CM: L85.3 ICD-9-CM: 701.1 Encounter for immunization     ICD-10-CM: C88 ICD-9-CM: V03.89 Bronchitis     ICD-10-CM: J40 ICD-9-CM: 031 Vitals BP Pulse Temp Resp Height(growth percentile) Weight(growth percentile) 123/70 (BP 1 Location: Left arm, BP Patient Position: Sitting) 76 97.9 °F (36.6 °C) (Oral) 16 5' 1\" (1.549 m) 149 lb (67.6 kg) LMP SpO2 BMI OB Status Smoking Status 06/28/2010 96% 28.15 kg/m2 Postmenopausal Former Smoker Vitals History BMI and BSA Data Body Mass Index Body Surface Area  
 28.15 kg/m 2 1.71 m 2 Preferred Pharmacy Pharmacy Name Phone [facility] Nelida Peck Zain [phone] Your Updated Medication List  
  
   
This list is accurate as of 3/1/18 12:15 PM.  Always use your most recent med list.  
  
  
  
  
 aspirin 81 mg tablet Take 81 mg by mouth daily. azithromycin 250 mg tablet Commonly known as:  Lala Rogue Take 1 Tab by mouth See Admin Instructions for 6 doses. Take 2 tabs on day one followed by 1 tab daily for a total of 5 days. b complex vitamins tablet Take 1 Tab by mouth daily. CO Q-10 100 mg capsule Generic drug:  co-enzyme Q-10 Take 100 mg by mouth daily. dulaglutide 0.75 mg/0.5 mL sub-q pen Commonly known as:  TRULICITY  
0.5 mL by SubCUTAneous route every seven (7) days. FREESTYLE LITE METER monitoring kit Generic drug:  Blood-Glucose Meter Test 4 times daily--Dx: E11.65 FREESTYLE LITE STRIPS strip Generic drug:  glucose blood VI test strips Test 4 times daily--Dx: E11.65--90 [place] supply [place]  
  
 gemfibrozil 600 mg tablet Commonly known as:  LOPID  
 Take 1 Tab by mouth two (2) times a day. insulin glargine 100 unit/mL (3 mL) Inpn Commonly known as:  LANAAKASHBASAGLAR  
38-42 Units by SubCUTAneous route nightly. Dose change 6/22/17--updated med list--did not send prescription to the pharmacy  
  
 insulin lispro 100 unit/mL kwikpen Commonly known as:  HumaLOG KwikPen Insulin  
4 units with breakfast and 6 units with lunch and dinner. Max 45 units per day--Dose change 6/22/17--updated med list--did not send prescription to the pharmacy  
  
 lisinopril 20 mg tablet Commonly known as:  PRINIVIL, ZESTRIL  
TAKE ONE TABLET BY MOUTH ONCE DAILY. metFORMIN 500 mg tablet Commonly known as:  GLUCOPHAGE Take 1 tab in the morning and 2 tabs at dinner  
  
 phentermine 37.5 mg tablet Commonly known as:  ADIPEX-P Take 1 tablet before breakfast  
  
 pravastatin 20 mg tablet Commonly known as:  PRAVACHOL  
TAKE 1 TABLET BY MOUTH ONCE DAILY  
  
 VITAMIN D3 1,000 unit tablet Generic drug:  cholecalciferol Take 2,000 Units by mouth daily. ZYRTEC PO Take  by mouth daily. Prescriptions Sent to Pharmacy Refills  
 azithromycin (ZITHROMAX) 250 mg tablet 0 Sig: Take 1 Tab by mouth See Admin Instructions for 6 doses. Take 2 tabs on day one followed by 1 tab daily for a total of 5 days. Class: Normal  
 Pharmacy: [facility] DR JOCELYN AMARAL [address] Ph #: [phone] Route: Oral  
  
We Performed the Following PNEUMOCOCCAL CONJ VACCINE 13 VALENT IM [id number] CPT(R)] ID IMMUNIZ ADMIN,1 SINGLE/COMB VAC/TOXOID [id number] CPT(R)] REFERRAL TO DERMATOLOGY [REF19 Custom] Comments:  
 Evaluate for hair loss Referral Information Referral ID Referred By Referred To  
  
 [id number] APPA Kaylie Ward MD   
   [address] Phone: [phone] Fax: [phone] Visits Status Start Date End Date 1 New Request 3/1/18 3/1/19 If your referral has a status of pending review or denied, additional information will be sent to support the outcome of this decision. Patient Instructions Body Mass Index: Care Instructions Your Care Instructions Body mass index (BMI) can help you see if your weight is raising your risk for health problems. It uses a formula to compare how much you weigh with how tall you are. · A BMI lower than 18.5 is considered underweight. · A BMI between 18.5 and 24.9 is considered healthy. · A BMI between 25 and 29.9 is considered overweight. A BMI of 30 or higher is considered obese. If your BMI is in the normal range, it means that you have a lower risk for weight-related health problems. If your BMI is in the overweight or obese range, you may be at increased risk for weight-related health problems, such as high blood pressure, heart disease, stroke, arthritis or joint pain, and diabetes. If your BMI is in the underweight range, you may be at increased risk for health problems such as fatigue, lower protection (immunity) against illness, muscle loss, bone loss, hair loss, and hormone problems. BMI is just one measure of your risk for weight-related health problems. You may be at higher risk for health problems if you are not active, you eat an unhealthy diet, or you drink too much alcohol or use tobacco products. Follow-up care is a key part of your treatment and safety. Be sure to make and go to all appointments, and call your doctor if you are having problems. It's also a good idea to know your test results and keep a list of the medicines you take. How can you care for yourself at home? · Practice healthy eating habits. This includes eating plenty of fruits, vegetables, whole grains, lean protein, and low-fat dairy. · If your doctor recommends it, get more exercise. Walking is a good choice. Bit by bit, increase the amount you walk every day.  Try for at least 30 minutes on most days of the week. · Do not smoke. Smoking can increase your risk for health problems. If you need help quitting, talk to your doctor about stop-smoking programs and medicines. These can increase your chances of quitting for good. · Limit alcohol to 2 drinks a day for men and 1 drink a day for women. Too much alcohol can cause health problems. If you have a BMI higher than 25 · Your doctor may do other tests to check your risk for weight-related health problems. This may include measuring the distance around your waist. A waist measurement of more than 40 inches in men or 35 inches in women can increase the risk of weight-related health problems. · Talk with your doctor about steps you can take to stay healthy or improve your health. You may need to make lifestyle changes to lose weight and stay healthy, such as changing your diet and getting regular exercise. If you have a BMI lower than 18.5 · Your doctor may do other tests to check your risk for health problems. · Talk with your doctor about steps you can take to stay healthy or improve your health. You may need to make lifestyle changes to gain or maintain weight and stay healthy, such as getting more healthy foods in your diet and doing exercises to build muscle. Where can you learn more? Go to http://kerrie-bob.info/. Enter S176 in the search box to learn more about \"Body Mass Index: Care Instructions. \" Current as of: October 13, 2016 Content Version: 11.4 © 2841-1910 [employer]. Care instructions adapted under license by [employer] (which disclaims liability or warranty for this information).  If you have questions about a medical condition or this instruction, always ask your healthcare professional. [place] [address] any warranty or liability for your use of this information. 
 
------------------------------------ 
 Ideally schedule colonoscopy - as you must do this to screen for colon cancer Introducing [facility]! Dear [place]: 
Thank you for requesting a [facility]ctions account. Our records indicate that you already have an active Medical Connections account. You can access your account anytime at [email or web address] Did you know that you can access your hospital and ER discharge instructions at any time in Medical Connections? You can also review all of your test results from your hospital stay or ER visit. Additional Information If you have questions, please visit the Frequently Asked Questions section of the Medical Connections website at [email or web address] Remember, Medical Connections is NOT to be used for urgent needs. For medical emergencies, dial 911. Now available from your iPhone and Android! Please provide this summary of care documentation to your next provider. Your primary care clinician is listed as ANDREA Yanes. If you have any questions after today's visit, please call [phone].

## 2018-03-05 RX ORDER — BLOOD-GLUCOSE METER
KIT MISCELLANEOUS
Qty: 400 STRIP | Refills: 3 | Status: SHIPPED | OUTPATIENT
Start: 2018-03-05 | End: 2019-01-29 | Stop reason: SDUPTHER

## 2018-03-05 RX ORDER — GEMFIBROZIL 600 MG/1
TABLET, FILM COATED ORAL
Qty: 180 TAB | Refills: 3 | COMMUNITY
Start: 2018-03-05 | End: 2019-01-29 | Stop reason: SDUPTHER

## 2018-03-05 NOTE — TELEPHONE ENCOUNTER
From: [place]  To: Dominic Voss MD  Sent: 3/5/2018 12:41 PM EST  Subject: Medication Renewal Request    Original authorizing provider: Dominic Voss MD    [place] would like a refill of the following medications:  FREESTYLE LITE STRIPS strip Dominic Voss MD]    Preferred pharmacy: [facility]era, Could I please have m Freestyle Lite Strips prescription filled at [facility], [address] [facility]? Thank you Yair Hernandez

## 2018-03-13 DIAGNOSIS — L65.9 HAIR LOSS: Primary | ICD-10-CM

## 2018-03-13 RX ORDER — PRAVASTATIN SODIUM 20 MG/1
TABLET ORAL
Qty: 90 TAB | Refills: 3
Start: 2018-03-13 | End: 2018-03-19 | Stop reason: SDUPTHER

## 2018-03-17 ENCOUNTER — PATIENT MESSAGE (OUTPATIENT)
Dept: ENDOCRINOLOGY | Age: 63
End: 2018-03-17

## 2018-03-19 RX ORDER — PRAVASTATIN SODIUM 20 MG/1
TABLET ORAL
Qty: 45 TAB | Refills: 0 | Status: SHIPPED | OUTPATIENT
Start: 2018-03-19 | End: 2018-03-19 | Stop reason: ALTCHOICE

## 2018-03-19 RX ORDER — PRAVASTATIN SODIUM 10 MG/1
10 TABLET ORAL
Qty: 90 TAB | Refills: 3 | Status: SHIPPED | OUTPATIENT
Start: 2018-03-19 | End: 2019-01-29 | Stop reason: SDUPTHER

## 2018-03-19 NOTE — TELEPHONE ENCOUNTER
From     Maria Teresa Mendez      To     e Nurse Pool      Sent     3/17/2018  1:24 PM            Good morning Dr. Regan Halsted,   Would you be able to prescribe me Pravastatin 10mg? The 20mg that I am on is very small and is difficult to cut even using a pill cutter. If you could, would you please send it to [address], [place].      Thank you Dr Joyce Smith

## 2018-03-19 NOTE — TELEPHONE ENCOUNTER
----- Message from [place] sent at 3/17/2018  1:24 PM EDT -----  Regarding: Prescription Question  Contact: [phone]  Good morning Dr. Josh Smyth,  Would you be able to prescribe me Pravastatin 10mg? The 20mg that I am on is very small and is difficult to cut even using a pill cutter. If you could, would you please send it to [address], [place].      Thank you Dr Ginette Munguia

## 2018-03-19 NOTE — TELEPHONE ENCOUNTER
Addendum: 3/19/2018, 8:55 AM  Per MyChart message 3/13/18:  Misa Alexandre MD   to Giovanni Washington       8:28 PM   Why don't you try taking 1/2 tab of pravastatin and stay on 1 tab of gemfibrozil for now and see if this helps with the pain.  I'm happy to check your testosterone level.  I put an order directly into the [facility] system and you can stop by any [facility] and have this drawn at your convenience and I'll be in touch with the results. Last read by Giovanni Washington at [facility] PM on 3/15/2018.        Fuentes Howard LPN

## 2018-03-24 LAB — TESTOST SERPL-MCNC: 39.5 NG/DL (ref 7–40)

## 2018-03-26 RX ORDER — METFORMIN HYDROCHLORIDE 500 MG/1
TABLET, EXTENDED RELEASE ORAL
Qty: 360 TAB | Refills: 3 | Status: SHIPPED | OUTPATIENT
Start: 2018-03-26 | End: 2019-01-29 | Stop reason: SDUPTHER

## 2018-04-03 RX ORDER — MINOXIDIL 50 MG/G
1 AEROSOL, FOAM TOPICAL DAILY
Qty: 2 CAN | Refills: 2 | Status: SHIPPED | OUTPATIENT
Start: 2018-04-03 | End: 2019-06-27

## 2018-04-04 ENCOUNTER — HOSPITAL ENCOUNTER (OUTPATIENT)
Dept: LAB | Age: 63
Discharge: HOME | End: 2018-04-04

## 2018-04-04 ENCOUNTER — OFFICE VISIT (OUTPATIENT)
Dept: FAMILY PLANNING/WOMEN'S HEALTH CLINIC | Age: 63
End: 2018-04-04

## 2018-04-04 ENCOUNTER — HOSPITAL ENCOUNTER (OUTPATIENT)
Dept: MAMMOGRAPHY | Age: 63
Discharge: HOME | End: 2018-04-04

## 2018-04-04 VITALS — SYSTOLIC BLOOD PRESSURE: 120 MMHG | DIASTOLIC BLOOD PRESSURE: 66 MMHG

## 2018-04-04 DIAGNOSIS — Z12.31 VISIT FOR SCREENING MAMMOGRAM: ICD-10-CM

## 2018-04-04 DIAGNOSIS — Z01.419 WELL WOMAN EXAM WITH ROUTINE GYNECOLOGICAL EXAM: Primary | ICD-10-CM

## 2018-04-04 PROCEDURE — 77067 SCR MAMMO BI INCL CAD: CPT

## 2018-04-04 PROCEDURE — 88175 CYTOPATH C/V AUTO FLUID REDO: CPT | Performed by: PHYSICIAN ASSISTANT

## 2018-04-04 NOTE — LETTER
Izaiah Medina [place] [address] [place] [address] [place]                                       Date: 5/5/2019  [id number] [address] [id number] Dear Ms. Mendez, Your good health is important to us; this is why we are sending you this friendly reminder. As always, our goal is to be your partner in life-long wellness. Our records indicate that you need to schedule the  Screening Mammogram in 1 year that was recommended as a result of your examination performed on 4/4/18. The recommendation was due on 4/5/2019. Please call [phone] or [phone] to schedule this examination at your earliest convenience. If you have had this study done elsewhere, please contact us so we may update our records. If you have recently scheduled your appointment, kindly disregard this reminder. Sincerely, Izaiah Medina     [phone]

## 2018-04-04 NOTE — PROGRESS NOTES
Assessment/Plan:    Diagnoses and all orders for this visit:    1. Well woman exam with routine gynecological exam  -     PAP IG, RFX APTIMA HPV ASCUS ([id number]))    Discussed colonoscopy   Mammogram today  F/up in 1 year or sooner PRN       Follow-up Disposition: Not on [address] [facility] [place]  [address] expressed understanding of this plan. An AVS was printed and given to the patient.      ----------------------------------------------------------------------    Chief Complaint   Patient presents with    Well Woman     [facility] visit       History of Present Illness:     Menopause \"years ago\", specific complaints are vaginal dryness  Has some discomfort with intercourse due to the vaginal dryness, is aware about KY type lubricating jellies and does use them  She has no breast or other vaginal complaints  She does SBE and has not noted any concerning changes  She has not had a colonoscopy, she has had a recent FIT test that was negative    Last pap     Past Medical History:   Diagnosis Date    Chest pain     Saw Dr. Jaelyn Cox, negative stress test    Diabetes [facility])     Diverticulosis     seen on CT scan    Hyperlipidemia     Hypertension     Obesity     Sensory neuropathy 2012    Tingling L toe    Tendonitis, Achilles, left     Type II or unspecified type diabetes mellitus without mention of complication, uncontrolled     Vitamin D deficiency        Current Outpatient Prescriptions   Medication Sig Dispense Refill    Minoxidil 5 % foam 1 Each by Apply Externally route daily. 2 Can 2    metFORMIN ER (GLUCOPHAGE XR) 500 mg tablet Take 1 tab in the morning and 2 tabs at dinner for 2 weeks, then increase to 2 tabs twice daily--replaces IR metformin 360 Tab 3    pravastatin (PRAVACHOL) 10 mg tablet Take 1 Tab by mouth nightly.  90 Tab 3    FREESTYLE LITE STRIPS strip Test 4 times daily--Dx: E11.65--90 [place] supply [place] 400 Strip 3    gemfibrozil (LOPID) 600 mg tablet Take 1 tab at night--Dose change 3/5/18--updated med list--did not send prescription to the pharmacy 180 Tab 3    CETIRIZINE HCL (ZYRTEC PO) Take  by mouth daily.  azithromycin (ZITHROMAX) 250 mg tablet Take 1 Tab by mouth See Admin Instructions for 6 doses. Take 2 tabs on day one followed by 1 tab daily for a total of 5 days. 6 Tab 0    lisinopril (PRINIVIL, ZESTRIL) 20 mg tablet TAKE ONE TABLET BY MOUTH ONCE DAILY. 90 Tab 3    phentermine (ADIPEX-P) 37.5 mg tablet Take 1 tablet before breakfast 100 Tab 1    b complex vitamins tablet Take 1 Tab by mouth daily.  insulin lispro (HUMALOG KWIKPEN) 100 unit/mL kwikpen 4 units with breakfast and 6 units with lunch and dinner. Max 45 units per day--Dose change 6/22/17--updated med list--did not send prescription to the pharmacy (Patient taking differently: 4 units with breakfast and 6 units with lunch and dinner. Max 45 units per day--Dose change 6/22/17--updated med list--did not send prescription to the pharmacy  Indications: Sliding scale) 60 mL 3    insulin glargine (LANTUS,BASAGLAR) 100 unit/mL (3 mL) inpn 38-42 Units by SubCUTAneous route nightly. Dose change 6/22/17--updated med list--did not send prescription to the pharmacy      dulaglutide (TRULICITY) 0.44 KZ/2.6 mL sub-q pen 0.5 mL by SubCUTAneous route every seven (7) days. 16 Syringe 3    cholecalciferol (VITAMIN D3) 1,000 unit tablet Take 2,000 Units by mouth daily.  FREESTYLE LITE METER monitoring kit Test 4 times daily--Dx: E11.65 1 Kit 0    coenzyme q10 (CO Q-10) 100 mg Cap Take 100 mg by mouth daily.  aspirin 81 mg tablet Take 81 mg by mouth daily.          Allergies   Allergen Reactions    Carrot Swelling    Apple Swelling     Apple skin causes her lips to swell    Gemfibrozil Other (comments)     Back and flank pain that has improved with stopping when taking in combination with pravastatin    Simvastatin Myalgia       Social History   Substance Use Topics  Smoking status: Former Smoker     Packs/day: 0.50     Years: 20.00     Quit date: 11/3/2004    Smokeless tobacco: Never Used    Alcohol use No       Family History   Problem Relation Age of Onset    Diabetes Father     Heart Disease Father 46     MI    Diabetes Brother     Diabetes Other      multiple family members on father's side    Diabetes Brother     Diabetes Brother     Stroke Neg Hx        Physical Exam:     Visit Vitals    /66    LMP 06/28/2010     Pleasant, looks well  A&Ox3  WDWN NAD  Respirations normal and non labored  Breast exam- christen neg for mass, tenderness, skin color changes, dimpling, retractions or nipple changes  Pelvic exam- ext neg for lesions or discharge.  Vagina and cervix with typical post menopausal changes present, no abnl discharge or lesions  Uterus and adnexal exam- no mass or tenderness  Rectal exam- neg heme guaiac testing, no masses in rectum

## 2018-04-04 NOTE — PROGRESS NOTES
EVERY WOMANS LIFE HISTORY QUESTIONNAIRE       No Yes Comments   Has a doctor ever seen or felt anything wrong with your breast? [x]                                  []                                     Have you ever had a breast biopsy? [x]                                  []                                          When and where was last mammogram performed? 3/2017 with [facility]    Have you ever been told that there was a problem on your mammogram?   No Yes Comments   [x]                                  []                                       Do you have breast implants? No Yes Comments   [x]                                  []                                       When was your last Pap test performed? 1/2015 with [facility]    Have you ever had an abnormal Pap test?   No Yes Comments   [x]                                  []                                       Have you had a hysterectomy? No Yes Comments (why)   [x]                                  []                                       Have you ever been diagnosed with any type of Cancer   No Yes Comments (type,when,where,type of treatment   [x]                                  []                                          Has a family member been diagnosed with breast or ovarian cancer? No Yes Comments (which family members, and type   [x]                                  []                                       Did your mother take TERE? No Yes Unknown   []                                  []                                  X     Do you have a history of HIV exposure? No Yes    [x]                                  []                                         Have you been through menopause?    No Yes Date of LMP   []                                  [x]                                  At least 10 yrs ago     Are you taking hormone replacement therapy (HRT)     No Yes Comments   [x]                                  []                                       How many times have you been pregnant? 1        Number of live births ? 1    Are you experiencing any of the following? No Yes Comments   Nipple Discharge [x]                                  []                                     Breast Lump/Masses [x]                                  []                                     Breast Skin Changes [x]                                  []                                          No Yes Comments   Vaginal Discharge [x]                                  []                                     Abnormal/unusual vaginal bleeding [x]                                  []                                         Are you experiencing any other health problems?     Diabetes, high chol,---followed by Dr. Zainab Dewey

## 2018-04-12 ENCOUNTER — DOCUMENTATION ONLY (OUTPATIENT)
Dept: FAMILY PLANNING/WOMEN'S HEALTH CLINIC | Age: 63
End: 2018-04-12

## 2018-04-20 ENCOUNTER — PATIENT MESSAGE (OUTPATIENT)
Dept: ENDOCRINOLOGY | Age: 63
End: 2018-04-20

## 2018-04-24 NOTE — TELEPHONE ENCOUNTER
I spoke with patient and informed her that we still do not have the lantus. I informed her that [facility] could track the order. She stated that she will call them and let me know the outcome.

## 2018-05-08 ENCOUNTER — DOCUMENTATION ONLY (OUTPATIENT)
Dept: ENDOCRINOLOGY | Age: 63
End: 2018-05-08

## 2018-05-30 ENCOUNTER — TELEPHONE (OUTPATIENT)
Dept: ENDOCRINOLOGY | Age: 63
End: 2018-05-30

## 2018-07-14 NOTE — TELEPHONE ENCOUNTER
Spoke with patient. Complaint of right sided pain that radiates to back (flank area.) Recent UTI per patient. Denies urinary symptoms at this time.   -Offered appointment for today with Dr. Tye Avila. Patient declines and states she is currently in [place] visiting family.   -She requested appointment for 6/23/17 in morning since she has an appointment with Dr. Jaclyn Triplett at [address]. Tye Avila did not have availability, but Dr. Néstor Novoa could see her at 1300. Patient accepted appointment and scheduled. -Advised push fluids (water), and if symptoms worsen prior to appointment, she should be seen at an urgent care facility. Patient verbalized understanding. 4

## 2019-01-07 ENCOUNTER — TELEPHONE (OUTPATIENT)
Dept: ENDOCRINOLOGY | Age: 64
End: 2019-01-07

## 2019-01-07 NOTE — TELEPHONE ENCOUNTER
Regarding: FW: Prescription Question  Contact: [phone]      ----- Message -----  From: Perla Riley  Sent: 1/7/2019  11:08 AM  To: Rde Nurse Pool  Subject: Prescription Question                            ----- Message from [address] [place] [address] [address], Generic sent at 1/7/2019 11:08 AM EST -----    Aguilar Espinosa,    I called [employer] and was told I can request a LANTUS refill thru my doctor (you) before April 16, 2019. Could you put in another refill for me so I can pick it up when I see you on the 29th of Jan?    I am excited to see y'all. Thank you Dr. Clifton January.       Sincerely,  Gladys Cazares

## 2019-01-11 ENCOUNTER — DOCUMENTATION ONLY (OUTPATIENT)
Dept: ENDOCRINOLOGY | Age: 64
End: 2019-01-11

## 2019-01-29 ENCOUNTER — OFFICE VISIT (OUTPATIENT)
Dept: ENDOCRINOLOGY | Age: 64
End: 2019-01-29

## 2019-01-29 ENCOUNTER — OFFICE VISIT (OUTPATIENT)
Dept: INTERNAL MEDICINE CLINIC | Age: 64
End: 2019-01-29

## 2019-01-29 VITALS
SYSTOLIC BLOOD PRESSURE: 132 MMHG | BODY MASS INDEX: 29.27 KG/M2 | RESPIRATION RATE: 16 BRPM | OXYGEN SATURATION: 98 % | WEIGHT: 155 LBS | HEIGHT: 61 IN | HEART RATE: 80 BPM | DIASTOLIC BLOOD PRESSURE: 60 MMHG

## 2019-01-29 VITALS
WEIGHT: 155.8 LBS | TEMPERATURE: 97.8 F | DIASTOLIC BLOOD PRESSURE: 70 MMHG | HEIGHT: 61 IN | HEART RATE: 75 BPM | SYSTOLIC BLOOD PRESSURE: 114 MMHG | OXYGEN SATURATION: 99 % | BODY MASS INDEX: 29.42 KG/M2 | RESPIRATION RATE: 18 BRPM

## 2019-01-29 DIAGNOSIS — E66.3 OVERWEIGHT: ICD-10-CM

## 2019-01-29 DIAGNOSIS — E78.5 HYPERLIPIDEMIA LDL GOAL <100: ICD-10-CM

## 2019-01-29 DIAGNOSIS — N28.1 RENAL CYST: ICD-10-CM

## 2019-01-29 DIAGNOSIS — E83.52 HYPERCALCEMIA: ICD-10-CM

## 2019-01-29 DIAGNOSIS — Z79.4 CONTROLLED TYPE 2 DIABETES MELLITUS WITHOUT COMPLICATION, WITH LONG-TERM CURRENT USE OF INSULIN (HCC): ICD-10-CM

## 2019-01-29 DIAGNOSIS — E55.9 VITAMIN D DEFICIENCY: ICD-10-CM

## 2019-01-29 DIAGNOSIS — Z11.59 NEED FOR HEPATITIS C SCREENING TEST: ICD-10-CM

## 2019-01-29 DIAGNOSIS — E11.21 TYPE 2 DIABETES WITH NEPHROPATHY (HCC): Primary | ICD-10-CM

## 2019-01-29 DIAGNOSIS — E11.9 CONTROLLED TYPE 2 DIABETES MELLITUS WITHOUT COMPLICATION, WITH LONG-TERM CURRENT USE OF INSULIN (HCC): ICD-10-CM

## 2019-01-29 DIAGNOSIS — I10 ESSENTIAL HYPERTENSION, BENIGN: ICD-10-CM

## 2019-01-29 DIAGNOSIS — Z12.11 SCREENING FOR COLON CANCER: ICD-10-CM

## 2019-01-29 DIAGNOSIS — Z12.31 SCREENING MAMMOGRAM, ENCOUNTER FOR: Primary | ICD-10-CM

## 2019-01-29 RX ORDER — LISINOPRIL 20 MG/1
TABLET ORAL
Qty: 180 TAB | Refills: 1 | Status: SHIPPED | OUTPATIENT
Start: 2019-01-29 | End: 2019-06-27 | Stop reason: SDUPTHER

## 2019-01-29 RX ORDER — BLOOD-GLUCOSE METER
KIT MISCELLANEOUS
Qty: 400 STRIP | Refills: 3 | Status: CANCELLED | OUTPATIENT
Start: 2019-01-29

## 2019-01-29 RX ORDER — PRAVASTATIN SODIUM 10 MG/1
10 TABLET ORAL
Qty: 180 TAB | Refills: 3 | Status: SHIPPED | OUTPATIENT
Start: 2019-01-29 | End: 2019-06-27 | Stop reason: SDUPTHER

## 2019-01-29 RX ORDER — INSULIN LISPRO 100 [IU]/ML
INJECTION, SOLUTION INTRAVENOUS; SUBCUTANEOUS
Qty: 60 ML | Refills: 3 | Status: CANCELLED | OUTPATIENT
Start: 2019-01-29

## 2019-01-29 RX ORDER — GEMFIBROZIL 600 MG/1
TABLET, FILM COATED ORAL
Qty: 180 TAB | Refills: 1 | Status: SHIPPED | OUTPATIENT
Start: 2019-01-29 | End: 2019-06-27 | Stop reason: SDUPTHER

## 2019-01-29 RX ORDER — GEMFIBROZIL 600 MG/1
TABLET, FILM COATED ORAL
Qty: 180 TAB | Refills: 3 | Status: CANCELLED | OUTPATIENT
Start: 2019-01-29

## 2019-01-29 RX ORDER — INSULIN GLARGINE 100 [IU]/ML
38-42 INJECTION, SOLUTION SUBCUTANEOUS
Status: CANCELLED | OUTPATIENT
Start: 2019-01-29

## 2019-01-29 RX ORDER — PRAVASTATIN SODIUM 10 MG/1
10 TABLET ORAL
Qty: 90 TAB | Refills: 3 | Status: CANCELLED | OUTPATIENT
Start: 2019-01-29

## 2019-01-29 RX ORDER — BLOOD-GLUCOSE METER
KIT MISCELLANEOUS
Qty: 800 STRIP | Refills: 1 | Status: SHIPPED | OUTPATIENT
Start: 2019-01-29 | End: 2019-06-27 | Stop reason: SDUPTHER

## 2019-01-29 RX ORDER — LISINOPRIL 20 MG/1
TABLET ORAL
Qty: 90 TAB | Refills: 3 | Status: CANCELLED | OUTPATIENT
Start: 2019-01-29

## 2019-01-29 RX ORDER — METFORMIN HYDROCHLORIDE 500 MG/1
TABLET, EXTENDED RELEASE ORAL
Qty: 540 TAB | Refills: 1 | Status: SHIPPED | OUTPATIENT
Start: 2019-01-29 | End: 2019-06-27 | Stop reason: SDUPTHER

## 2019-01-29 RX ORDER — PHENTERMINE HYDROCHLORIDE 37.5 MG/1
TABLET ORAL
Qty: 100 TAB | Refills: 1 | Status: CANCELLED | OUTPATIENT
Start: 2019-01-29

## 2019-01-29 RX ORDER — PHENTERMINE HYDROCHLORIDE 37.5 MG/1
TABLET ORAL
Qty: 180 TAB | Refills: 1 | Status: SHIPPED | OUTPATIENT
Start: 2019-01-29 | End: 2019-06-27 | Stop reason: SDUPTHER

## 2019-01-29 RX ORDER — METFORMIN HYDROCHLORIDE 500 MG/1
TABLET, EXTENDED RELEASE ORAL
Qty: 360 TAB | Refills: 3 | Status: CANCELLED | OUTPATIENT
Start: 2019-01-29

## 2019-01-29 NOTE — PROGRESS NOTES
CC: Chronic medical issues - HTN, renal cyst 
 
 
HPI: 
 
She is a 61 y.o. female who presents for follow up HTN: this is well controlled on lisinopril 20mg Diabetes: This is well controlled on Trulicity with metformin and insulin - Saw Dr Juan R Sheriff this AM  
-She had a HA1C done in September in [facility] with good control 6.8 Hair loss: Complains of hair loss ongoing for several years but patient feels that it is more pronounced in the past year. She cut her hair short and is penciling in to hide loss. We have not identified cause. Unfortunately she is only here for 2 weeks prior to returning to [facility] and would be difficult to see a dermatologist on such short time Continues to have low flank pain on the right side side back pain. Discussed that this is MSK most likely advised to stretch and continue to keep moving No red flags, no weight loss, fevers, bowel of bladder incontinence. HM Pap smear up to date Mammogram scheduled April 4th Fit testing done in 2017 and negative -encouraged to schedule colonoscopy ROS: 
10 systems reviewed and negative other than HPI Past Medical History:  
Diagnosis Date  Chest pain 2012 Saw Dr. Yoel Dejesus, negative stress test  
 Diabetes [facility])  Diverticulosis   
 seen on CT scan  Hyperlipidemia  Hypertension  Obesity  Sensory neuropathy 2012 Tingling L toe  Tendonitis, Achilles, left 2012  Type II or unspecified type diabetes mellitus without mention of complication, uncontrolled  Vitamin D deficiency Current Outpatient Medications on File Prior to Visit Medication Sig Dispense Refill  Minoxidil 5 % foam 1 Each by Apply Externally route daily. 2 Can 2  
 CETIRIZINE HCL (ZYRTEC PO) Take  by mouth daily.  b complex vitamins tablet Take 1 Tab by mouth daily.  insulin lispro (HUMALOG KWIKPEN) 100 unit/mL kwikpen 4 units with breakfast and 6 units with lunch and dinner.   Max 45 units per day--Dose change 17--updated med list--did not send prescription to the pharmacy (Patient taking differently: 4 units with breakfast and 6 units with lunch and dinner. Max 45 units per day--Dose change 17--updated med list--did not send prescription to the pharmacy  Indications: Sliding scale) 60 mL 3  
 insulin glargine (LANTUS,BASAGLAR) 100 unit/mL (3 mL) inpn 38-42 Units by SubCUTAneous route nightly. Dose change 17--updated med list--did not send prescription to the pharmacy  cholecalciferol (VITAMIN D3) 1,000 unit tablet Take 2,000 Units by mouth daily.  FREESTYLE LITE METER monitoring kit Test 4 times daily--Dx: E11.65 1 Kit 0  
 coenzyme q10 (CO Q-10) 100 mg Cap Take 100 mg by mouth daily.  aspirin 81 mg tablet Take 81 mg by mouth daily. No current facility-administered medications on file prior to visit. Past Surgical History:  
Procedure Laterality Date  TYMPANOPLASTY    
 left Family History Problem Relation Age of Onset  Diabetes Father  Heart Disease Father 46 MI  
 Diabetes Brother  Diabetes Other   
     multiple family members on father's side  Diabetes Brother  Diabetes Brother  Stroke Neg Hx Reviewed and no changes Social History Socioeconomic History  Marital status:  Spouse name: Not on file  Number of children: Not on file  Years of education: Not on file  Highest education level: Not on file Social Needs  Financial resource strain: Not on file  Food insecurity - worry: Not on file  Food insecurity - inability: Not on file  Transportation needs - medical: Not on file  Transportation needs - non-medical: Not on file Occupational History  Not on file Tobacco Use  Smoking status: Former Smoker Packs/day: 0.50 Years: 20.00 Pack years: 10.00 Last attempt to quit: 11/3/2004 Years since quittin.2  Smokeless tobacco: Never Used Substance and Sexual Activity  Alcohol use: No  
 Drug use: No  
 Sexual activity: Yes  
  Partners: Male Other Topics Concern  Not on file Social History Narrative Lives in [place] with  of 10 years. Has a 33 yo daughter from a previous marriage. Used to work as a  for [employer] and a [employer] in [place] and for [employer]. Likes to read, cook, and shop. Visit Vitals /70 (BP 1 Location: Right arm, BP Patient Position: Sitting) Pulse 75 Temp 97.8 °F (36.6 °C) (Oral) Resp 18 Ht 5' 1\" (1.549 m) Wt 155 lb 12.8 oz (70.7 kg) LMP 06/28/2010 SpO2 99% BMI 29.44 kg/m² Physical Examination:  
General - Well appearing female HEENT - PERRL, TM no erythema/opacification, normal nasal turbinates, oropharynx no erythema or exudate, MMM Neck - supple, no bruits, no TMG, no LAD Pulm - clear to auscultation bilaterally Cardio - RRR, normal S1 S2, no murmur gallops or rubs Abd - soft, nontender, no masses, no HSM Extrem - no edema, +2 distal pulses Psych - normal affect, appropriate mood Diffuse hair loss noted. No single patch identified. Hair loss is diffuse. Monofilament R - normal sensation with micro filament L - normal sensation with micro filament Pulse DP R - 2+ (normal) L - 2+ (normal) Pulse TP R - 2+ (normal) L - 2+ (normal) Structural deformity R - None L - None Skin Integrity / Deformity R - None L - None Lab Results Component Value Date/Time WBC 8.0 01/23/2017 04:59 PM  
 HGB 14.2 01/23/2017 04:59 PM  
 HCT 41.9 01/23/2017 04:59 PM  
 PLATELET 099 28/94/1087 04:59 PM  
 MCV 89 01/23/2017 04:59 PM  
 
Lab Results Component Value Date/Time  Sodium 137 02/22/2018 10:19 AM  
 Potassium 3.9 02/22/2018 10:19 AM  
 Chloride 101 02/22/2018 10:19 AM  
 CO2 16 (L) 02/22/2018 10:19 AM  
 Anion gap 10 07/03/2015 01:20 AM  
 Glucose 218 (H) 02/22/2018 10:19 AM  
 BUN 13 02/22/2018 10:19 AM  
 Creatinine 0.54 (L) 02/22/2018 10:19 AM  
 BUN/Creatinine ratio 24 02/22/2018 10:19 AM  
 GFR est  02/22/2018 10:19 AM  
 GFR est non- 02/22/2018 10:19 AM  
 Calcium 9.2 02/22/2018 10:19 AM  
 
Lab Results Component Value Date/Time Cholesterol, total 291 (H) 02/22/2018 10:19 AM  
 HDL Cholesterol 18 (L) 02/22/2018 10:19 AM  
 LDL,Direct 102 (H) 11/11/2013 03:25 PM  
 LDL, calculated Comment 02/22/2018 10:19 AM  
 VLDL, calculated Comment 02/22/2018 10:19 AM  
 Triglyceride 2,720 ([facility]) 02/22/2018 10:19 AM  
 CHOL/HDL Ratio 5.5 (H) 11/03/2010 10:07 AM  
 
Lab Results Component Value Date/Time TSH 1.760 02/22/2018 10:19 AM  
 
No results found for: PSA, Jeanell Avers, PSAR3, [id number], [id number], PSALT Lab Results Component Value Date/Time Hemoglobin A1c 7.8 (H) 05/09/2016 02:00 PM  
 Hemoglobin A1c (POC) 7.1 02/22/2018 09:57 AM  
 
Lab Results Component Value Date/Time Vitamin D 25-Hydroxy 26.3 (L) 05/17/2011 02:41 PM  
 VITAMIN D, 25-HYDROXY CANCELED 02/22/2018 10:19 AM  
   
Lab Results Component Value Date/Time ALT (SGPT) 64 (H) 02/22/2018 10:19 AM  
 AST (SGOT) 44 (H) 02/22/2018 10:19 AM  
 Alk. phosphatase 127 (H) 02/22/2018 10:19 AM  
 Bilirubin, total <0.2 02/22/2018 10:19 AM  
 
  
  
Assessment/Plan: 
 
59-year-old woman with a history of elevated triglycerides diabetes presenting to follow-up on chronic medical problems 1. Hair loss 
- ongoing / unclear etiology 
- declined referral to derm 2. Diabetes: controlled. Encouraged to continue regimen per Dr Darlin Bolaños. - on trulicity, insulin and metformin 
- encouraged to continue working on weight loss 3. Elevated cholesterol with marked hypertriglyceridemia: on pravastatin and lopid. Counseled on diet today Fasting cholesterol ordered 4. HTN: controlled, continue lisinopril 5. Flank pain - work up was negative.  . Discussed that this is MSK most likely advised to stretch and continue to keep moving No red flags, no weight loss, fevers, bowel of bladder incontinence. 6. Right kidney cyst - repeat US to assess for change. I do not believe this is the cause of her pain. Previously referred to urologist 
 
Orders Placed This Encounter  BRENDON MAMMO BI SCREENING INCL CAD Standing Status:   Future Standing Expiration Date:   2/29/2020 Order Specific Question:   Reason for Exam  
  Answer:   screening mammogram  
 US RETROPERITONEUM COMP Standing Status:   Future Standing Expiration Date:   2/29/2020 Order Specific Question:   Reason for Exam  
  Answer:   renal cyst  
 METABOLIC PANEL, COMPREHENSIVE  
 HEMOGLOBIN A1C W/O EAG  
 MICROALBUMIN, UR, RAND W/ MICROALB/CREAT RATIO  HEPATITIS C AB  
 OCCULT BLOOD IMMUNOASSAY,DIAGNOSTIC  
 LIPID PANEL Follow-up Disposition: 
Return in about 6 months (around 7/29/2019).  
 
Idalmis Paulino MD

## 2019-01-29 NOTE — PROGRESS NOTES
Reviewed record in preparation for visit and have obtained necessary documentation. Identified pt with two pt identifiers(name and ). Chief Complaint Patient presents with  Flank Pain Health Maintenance Due Topic Date Due  
 Hepatitis C Test  1955  Pneumococcal Vaccine (1 of 1 - PPSV23) 1974  
 DTaP/Tdap/Td  (1 - Tdap) 1976  Shingles Vaccine (1 of 2) 2005  Albumin Urine Test  2018  Stool testing for trace blood  2018 [facility]  2018  Flu Vaccine  2018  Hemoglobin A1C    2018  Cholesterol Test   2019 Ms. Mendez has a reminder for a \"due or due soon\" health maintenance. I have asked that she discuss this further with her primary care provider for follow-up on this health maintenance. Coordination of Care Questionnaire: 
:  
 
1) Have you been to an emergency room, urgent care clinic since your last visit? no  
Hospitalized since your last visit? no          
 
2) Have you seen or consulted any other health care providers outside of [address] [place] [address] since your last visit? no  (Include any pap smears or colon screenings in this section.) 3) In the event something were to happen to you and you were unable to speak on your behalf, do you have an Advance Directive/ Living Will in place stating your wishes? NO Do you have an Advance Directive on file? no 
 
4) Are you interested in receiving information on Advance Directives? NO Patient is accompanied by  I have received verbal consent from [place] to discuss any/all medical information while they are present in the room.

## 2019-01-29 NOTE — PROGRESS NOTES
Chief Complaint Patient presents with  Diabetes f/u History of Present Illness: [place] [facility] is a 61 y.o. female here for follow up of diabetes. Weight up 5 lbs since last visit in 2/18. Has been in [facility] the past year and is only home for 2 weeks and then will be going back. Has been taking 38-45 units of lantus daily in the evening. Has been able to take 6-8 units of humalog with meals instead of 15 units with using weekly trulicity. Has seen readings in the 200s if she is eating more carbs but normally has been able to keep her sugars under 130 if she doesn't eat as many carbs. She now has insurance and I gave her the patient assistance supply of lantus and humalog that we have been saving for her but don't have any trulicity so she'll try to get this filled at the local pharmacy. Has been off the phentermine and gave her a coupon to get this filled at the [facility]. Current Outpatient Medications Medication Sig  
 metFORMIN ER (GLUCOPHAGE XR) 500 mg tablet Take 1 tab in the morning and 2 tabs at dinner for 2 weeks, then increase to 2 tabs twice daily--replaces IR metformin  pravastatin (PRAVACHOL) 10 mg tablet Take 1 Tab by mouth nightly.  FREESTYLE LITE STRIPS strip Test 4 times daily--Dx: E11.65--90 day supply as [place] going [place]  gemfibrozil (LOPID) 600 mg tablet Take 1 tab at night--Dose change 3/5/18--updated med list--did not send prescription to the pharmacy  CETIRIZINE HCL (ZYRTEC PO) Take  by mouth daily.  lisinopril (PRINIVIL, ZESTRIL) 20 mg tablet TAKE ONE TABLET BY MOUTH ONCE DAILY.  b complex vitamins tablet Take 1 Tab by mouth daily.  insulin lispro (HUMALOG KWIKPEN) 100 unit/mL kwikpen 4 units with breakfast and 6 units with lunch and dinner.   Max 45 units per day--Dose change 6/22/17--updated med list--did not send prescription to the pharmacy (Patient taking differently: 4 units with breakfast and 6 units with lunch and dinner. Max 45 units per day--Dose change 6/22/17--updated med list--did not send prescription to the pharmacy  Indications: Sliding scale)  insulin glargine (LANTUS,BASAGLAR) 100 unit/mL (3 mL) inpn 38-42 Units by SubCUTAneous route nightly. Dose change 6/22/17--updated med list--did not send prescription to the pharmacy  dulaglutide (TRULICITY) 8.71 AI/4.5 mL sub-q pen 0.5 mL by SubCUTAneous route every seven (7) days.  cholecalciferol (VITAMIN D3) 1,000 unit tablet Take 2,000 Units by mouth daily.  FREESTYLE LITE METER monitoring kit Test 4 times daily--Dx: E11.65  
 coenzyme q10 (CO Q-10) 100 mg Cap Take 100 mg by mouth daily.  aspirin 81 mg tablet Take 81 mg by mouth daily.  Minoxidil 5 % foam 1 Each by Apply Externally route daily.  phentermine (ADIPEX-P) 37.5 mg tablet Take 1 tablet before breakfast (Patient not taking: Reported on 1/29/2019) No current facility-administered medications for this visit. Allergies Allergen Reactions  Carrot Swelling  Apple Swelling Apple skin causes her lips to swell  Gemfibrozil Other (comments) Back and flank pain that has improved with stopping when taking in combination with pravastatin  Simvastatin Myalgia Review of Systems: - Eyes: no blurry vision or double vision - Cardiovascular: no chest pain - Respiratory: no shortness of breath - Musculoskeletal: no myalgias - Neurological: no numbness/tingling in extremities Physical Examination: 
Blood pressure 132/60, pulse 80, resp. rate 16, height 5' 1\" (1.549 m), weight 155 lb (70.3 kg), last menstrual period 06/28/2010, SpO2 98 %. - General: pleasant, no distress, good eye contact  
- Neck: no carotid bruits - Cardiovascular: regular, normal rate, nl s1 and s2, no m/r/g,  
- Respiratory: clear bilaterally - Integumentary: no edema,  
- Psychiatric: normal mood and affect Diabetic foot exam:  
 
Left Foot: 
 Visual Exam: normal  
 Pulse DP: 2+ (normal) Filament test: normal sensation Vibratory sensation: normal 
   
Right Foot: 
 Visual Exam: normal  
 Pulse DP: 2+ (normal) Filament test: normal sensation Vibratory sensation: normal 
 
 
 
Data Reviewed: 9/25/18 
- Hgb A1c 6.8% 
- lipids: total 177 ,  HDL 43, ,  
- ALT 32, AST 25 
- BUN/Cr 15/0.6 
- vitamin D 51 
- TSH 0.9 
- microalbumin 47 Assessment/Plan: 1. DM w/o complication type II, uncontrolled (250.02) her most recent Hgb A1c was 6.8% in 9/18 down form 7.1% in 2/18 up from 6.6% in 6/17 down from 7.9% in 1/17 up from 7.8% in 5/16 down from 8.9% in 2/16 up from 8.1% in 5/15 up from 7.5% in 1/15 down from 8.1% in 7/14 up from 7.1% in 4/14 in [facility] down from 7.2% in 11/13 up from 6.9% in 8/13 down from 7.3% in 4/13 up from 6.9% in Nov up from 6.2% in August down from 6.5% in April up from 6.4% in December down from 6.9% in May 2011 down from 7.9% in November 2010 down from 9.2% in March 2010 prior to starting insulin. A1c is slightly higher but hasn't been watching diet as closely. Overall control is still good so wont' make any changes 
- cont Lantus 35-40 units at bedtime    
- cont Humalog 4-6 units with meals 
- cont Metformin  mg in am and 1g in pm 
- cont trulicity 4.06 mg weekly 
- foot exam done 1/19 
- optho UTD 4/17 
- microalbumin 153 11/10 down to 30.1 in 1/12, up to 58 in 4/13 (increased lis to 20 at that time) and down to 25 in 8/13, up to 47 in 5/15 and stable at 47 in 5/16 and 43 in 6/17 and 47 in 9/18 
- check bs 3-4x day 2. Unspecified essential hypertension (401.9) her BP was at goal < 140/90  
- cont lisinopril 20 mg daily 3. Other and unspecified hyperlipidemia (272.4) Given DM, Goal LDL < 100, non-HDL < 130, and TG < 150. Myalgias with simvastatin. Was changed from gemfibrozil to pravastatin in May 2012.    with non-HDL of 149 at that time off therapy down to 62 and 126 in August. TGs > 500 in 11/12 and down to 353 in 4/13 but up to 741 in 8/13 so restarted gemfibrozil at that time. TG down to 285 in 11/13. Up to 343 in 7/14. Down to 183 in 1/15. LDL 85 and  in 5/15.  and  in 2/16. LDL 65 and  in 5/16 with lower A1c. LDL 87 and  in 1/17. LDL 72 and  in 6/17. Having some memory loss so decreased dose to 1/2 tab daily and stopped lopid due to myalgias and both symptoms are better. Restarted lopid in 2/18 when TG 2720 but had more myalgias on bid dosing so decreased to daily dosing and  and  in 9/18 
- cont lopid 600 mg daily 
- cont prava 10 mg daily 4. Unspecified vitamin D deficiency (268.9) Level was 19 in November 2010 on 2000 units daily so increased to 4000 units daily and it was up to 26.3 in May 2011. On 5000 units daily her level was 24 in January 2012 so I increased her to 10,000 units daily at that time and level was 68 in April 2012 and 62 in August and 72 in November and 69 in 4/13 so decreased her dose to 5000 units at that time and level 40 in 8/13. Calcium level was up in 11/13 to 11.0 so stopped vitamin D at that time and level 32 in 5/15. Down to 24 in 2/16 and 28 in 1/17. Restarted 1000 units daily and up to 31.5 in 6/17 and 51 in 9/18 
- cont vitamin D 1000 units 2 tabs daily - check Vitamin D 25-OH level at next visit 5. Hypercalcemia: Had a level of 10.4 in 1/12 and no other abnormal values until 10.5 in 8/13 and up to 11 in 11/13. Stopped vitamin D and mvi at that time. Down to 10 in 6/14. Found to have a 2 mm stone on CT scan in [facility] in 4/14. Will hold on further evaluation given she doesn't have insurance at this time but will plan on drawing a PTH level in the future. Repeat calcium was 11 in 1/15 and 10.4 in 5/15. Up to 10.9 in 2/16 but down to 10.2 in 5/16 and in 1/17 and 9.7 in 6/17 and 10 in 9/18 
- follow on cmp 6.   Obesity: weight down 8 lbs from 5/15 to 2/16 and 2 lbs by 5/16 but only taking 1/2 tab in am consistently and down 5 lbs by 1/17. Wt up 1 lb by 6/17 and down 2 lbs by 2/18 and up 5 lbs by 1/19 
- cont phentermine 37.5 mg 1 whole tab daily 
- trulicity as above Patient Instructions 1) Let me know if you have any trouble getting any of the meds filled. Follow-up Disposition: 
Return for when you return to the [address] [place] [address],[address]. Copy sent to: 
Mami Perla MD

## 2019-01-29 NOTE — PROGRESS NOTES
Chief Complaint Patient presents with  Diabetes f/u 1. Have you been to the ER, urgent care clinic since your last visit? Hospitalized since your last visit? No 
 
2. Have you seen or consulted any other health care providers outside of the [address] [place] [address] since your last visit? Include any pap smears or colon screening. No 
 
Pt would like prescriptions for one year due to being out of the country.

## 2019-01-30 ENCOUNTER — TELEPHONE (OUTPATIENT)
Dept: INTERNAL MEDICINE CLINIC | Age: 64
End: 2019-01-30

## 2019-01-30 LAB
ALBUMIN SERPL-MCNC: 5 G/DL (ref 3.6–4.8)
ALBUMIN/CREAT UR: 115.6 MG/G CREAT (ref 0–30)
ALBUMIN/GLOB SERPL: 1.7 {RATIO} (ref 1.2–2.2)
ALP SERPL-CCNC: 105 IU/L (ref 39–117)
ALT SERPL-CCNC: 26 IU/L (ref 0–32)
AST SERPL-CCNC: 26 IU/L (ref 0–40)
BILIRUB SERPL-MCNC: 0.3 MG/DL (ref 0–1.2)
BUN SERPL-MCNC: 13 MG/DL (ref 8–27)
BUN/CREAT SERPL: 21 (ref 12–28)
CALCIUM SERPL-MCNC: 10.4 MG/DL (ref 8.7–10.3)
CHLORIDE SERPL-SCNC: 103 MMOL/L (ref 96–106)
CHOLEST SERPL-MCNC: 176 MG/DL (ref 100–199)
CO2 SERPL-SCNC: 22 MMOL/L (ref 20–29)
CREAT SERPL-MCNC: 0.62 MG/DL (ref 0.57–1)
CREAT UR-MCNC: 56.4 MG/DL
GLOBULIN SER CALC-MCNC: 3 G/DL (ref 1.5–4.5)
GLUCOSE SERPL-MCNC: 98 MG/DL (ref 65–99)
HBA1C MFR BLD: 7.6 % (ref 4.8–5.6)
HCV AB S/CO SERPL IA: <0.1 S/CO RATIO (ref 0–0.9)
HDLC SERPL-MCNC: 44 MG/DL
LDLC SERPL CALC-MCNC: 105 MG/DL (ref 0–99)
MICROALBUMIN UR-MCNC: 65.2 UG/ML
POTASSIUM SERPL-SCNC: 4.1 MMOL/L (ref 3.5–5.2)
PROT SERPL-MCNC: 8 G/DL (ref 6–8.5)
SODIUM SERPL-SCNC: 141 MMOL/L (ref 134–144)
TRIGL SERPL-MCNC: 136 MG/DL (ref 0–149)
VLDLC SERPL CALC-MCNC: 27 MG/DL (ref 5–40)

## 2019-01-30 NOTE — TELEPHONE ENCOUNTER
----- Message from Diogenes Preston MD sent at 1/29/2019  2:30 PM EST -----  After reviewing Ms Mendez's chart today I noted she had a renal cyst in 2017 and US should be repeated to check for change. I ordered the [address]. Since this is not urgent it cannot be scheduled for today.

## 2019-01-31 ENCOUNTER — TELEPHONE (OUTPATIENT)
Dept: INTERNAL MEDICINE CLINIC | Age: 64
End: 2019-01-31

## 2019-01-31 NOTE — TELEPHONE ENCOUNTER
Patient states she needs a call back in reference to Ultrasound can't be scheduled/done today as patient wanted. Radiology advised they're booked. Please call to discuss to see if this can be done today as patient is leaving to go out of the country. Patient states they advised would have to be done tomorrow. Please call to discuss.  Thank you

## 2019-02-01 ENCOUNTER — HOSPITAL ENCOUNTER (OUTPATIENT)
Dept: ULTRASOUND IMAGING | Age: 64
Discharge: HOME | End: 2019-02-01
Attending: INTERNAL MEDICINE
Payer: COMMERCIAL

## 2019-02-01 ENCOUNTER — TELEPHONE (OUTPATIENT)
Dept: INTERNAL MEDICINE CLINIC | Age: 64
End: 2019-02-01

## 2019-02-01 DIAGNOSIS — N28.1 RENAL CYST: ICD-10-CM

## 2019-02-01 PROCEDURE — 76770 US EXAM ABDO BACK WALL COMP: CPT

## 2019-02-01 NOTE — TELEPHONE ENCOUNTER
Pt missed a call and wanted a call back possibly about an apt.  Best contact [phone]       Message received & copied from [facility] after closing on 1/31/19

## 2019-02-01 NOTE — TELEPHONE ENCOUNTER
[facility] message sent to patient with ultrasound results.   Advised to contact the office with any questions

## 2019-02-02 NOTE — PROGRESS NOTES
Triglycerides are better, cholesterol is close to goal, continue working on healthy diet Urine is showing protein mild- this is from diabetes continue the lisinopril to protect your kidneys Diabetes HA1C is 7.6 which is better - Normal kidney and liver function Calcium slightly elevated - do not take calcium supplements over the counter - ok to continue vitamin D

## 2019-02-07 ENCOUNTER — TELEPHONE (OUTPATIENT)
Dept: ENDOCRINOLOGY | Age: 64
End: 2019-02-07

## 2019-02-07 RX ORDER — INSULIN GLARGINE 100 [IU]/ML
INJECTION, SOLUTION SUBCUTANEOUS
Qty: 90 ML | Refills: 1 | Status: SHIPPED | OUTPATIENT
Start: 2019-02-07 | End: 2019-06-26 | Stop reason: SINTOL

## 2019-02-07 RX ORDER — DULAGLUTIDE 0.75 MG/.5ML
0.75 INJECTION, SOLUTION SUBCUTANEOUS
Qty: 24 SYRINGE | Refills: 1 | Status: SHIPPED | OUTPATIENT
Start: 2019-02-07 | End: 2019-07-01 | Stop reason: SDUPTHER

## 2019-02-07 NOTE — TELEPHONE ENCOUNTER
[employer] called, they re rober  Prior Auth form for pt Rx Trulicity. Per [facility] rep, just need to fill out the form and they can override the Prior Auth, due to pt is going out of Country. They want this form fax back today to  [phone].

## 2019-02-07 NOTE — TELEPHONE ENCOUNTER
Regarding: FW: Prescription Question  Contact: [phone]  No form received yet.  ----- Message -----  From: [place]  Sent: 2/6/2019   1:19 PM  To: Rde Nurse Pool  Subject: Prescription Question                            ----- Message from [address] [place] [address] 95, Generic sent at 2/6/2019  1:19 PM EST -----    Dr Maria Isabel Lambert -     Per Cassidy Danielle, form faxed 2x to Mariajose's attn yesterday. They faxed again today. Doc pls include LANTUS on the form as well. ALAN said multiple Rx's can be on the same form. Pls write me a prescription to go to:    [address] [facility] [address]   [facility] [address], [address]    I am attaching the Form on this text. Let me know please if there's a problem with transmission.      Thank you- Brandon Kim

## 2019-02-07 NOTE — TELEPHONE ENCOUNTER
Sent her the following message through [employer]:    I just got off the phone with Rena Toussaint with [employer] and he could not to a prior authorization over the phone and requested that I fax forms to an urgent fax number that he provided to receive a quantity override for 6 months. [employer] does not cover Lantus without first trying basaglar. This insulin is the EXACT SAME as lantus but made be [employer] (who also makes your Trulicity) and will be dosed the same way. Caitlyn Rincon assured me they would give me a response by tomorrow as I explained that you are leaving on Sunday. I went ahead and sent a prescription for the trulicity AND the basaglar to your [facility] listed below. I'll be in touch as soon as I hear back from [employer]. Take care and thanks for your patience with this.

## 2019-02-08 ENCOUNTER — TELEPHONE (OUTPATIENT)
Dept: ENDOCRINOLOGY | Age: 64
End: 2019-02-08

## 2019-02-08 LAB — HEMOCCULT STL QL IA: NEGATIVE

## 2019-02-08 NOTE — TELEPHONE ENCOUNTER
Called and spoke with pt. I explained as I had in my e-mail to her that [address] will not approve lantus unless she has tried basaglar which is the exact same insulin as lantus but made my a different company. Given that she is leaving in 2 days, there was no way I could get the lantus approved given she has never tried their preferred insulin. She will take this exactly the same way as the lantus. She told me that the pharmacy only had a 3 month supply for her to  of the basaglar and trulicity and they apparently tried to get it from other surrounding pharmacies and this will have to be ordered and won't arrive until Monday so she has been in touch with [address]  about trying to see if the other 3 months can be shipped to her in [facility]. I told her I would be happy to sign off on any paperwork if this is needed and she will be in touch if this is needed and I told her to contact me when she is back in the [place] so I can arrange for a f/u visit. she voiced understanding of this plan.

## 2019-02-08 NOTE — TELEPHONE ENCOUNTER
Pt called she wants to know why her Rx Lantus was changed to [employer]. Pt stated  she's always been on Lantus. Pt states she's flying out to [facility] this Sunday. Pt is asking a return call @ [phone].

## 2019-02-08 NOTE — TELEPHONE ENCOUNTER
Please call her to let her know that her trulicity and Basaglar have been approved so she can go and  both of these from her pharmacy in [place].

## 2019-05-20 ENCOUNTER — TELEPHONE (OUTPATIENT)
Dept: INTERNAL MEDICINE CLINIC | Age: 64
End: 2019-05-20

## 2019-05-20 NOTE — TELEPHONE ENCOUNTER
----- Message from Win Treviño sent at 5/20/2019 11:19 AM EDT -----  Regarding: Dr. Nugent Members: [phone]  No appts available  ----- Message from [address] [place] [address] [address], Generic sent at 5/18/2019  8:57 AM EDT -----    Appointment Request From: Corey Clay    With Provider: Katelyn Gaffney MD [facility]]    Preferred Date Range: 6/27/2019 - 6/27/2019    Preferred Times: Thursday Morning    Reason for visit: Request an Appointment    Comments:   We are flying back to the [address] [facility] on 26 June and I'd like to see Dr. Indigo Lieberman on the 27th.    I can come in early in the morning and then right after I have to see Dr. Luciana Gramajo at 10:30 Katey Schulte still have right side pain.  Thank you.          Message copied/pasted from [facility]

## 2019-06-26 ENCOUNTER — TELEPHONE (OUTPATIENT)
Dept: ENDOCRINOLOGY | Age: 64
End: 2019-06-26

## 2019-06-26 RX ORDER — INSULIN GLARGINE 100 [IU]/ML
INJECTION, SOLUTION SUBCUTANEOUS
Qty: 180 ML | Refills: 0 | Status: SHIPPED | OUTPATIENT
Start: 2019-06-26 | End: 2021-03-31 | Stop reason: SDUPTHER

## 2019-06-26 NOTE — TELEPHONE ENCOUNTER
Regarding: FW: Prescription Question  Contact: [phone]      ----- Message -----  From: Dimas Hamman  Sent: 6/21/2019   2:25 PM  To: Rde Nurse Pool  Subject: Prescription Question                            ----- Message from [address] [place] [address] [address], Generic sent at 6/21/2019  2:25 PM EDT -----    Good AM Dr. Viktoriya Moore to my Ins Co re: Meds/Insulin. I have not had good results with Basaglar. I'm gaining weight, I eat more, I'm sluggish, I wasn't like this on Lantus. ALAN said for you to you write  RX for Lantus. Ernestina Eller will wait for Pharmacy to contact them. Told ALAN  I only had one week in the [facility]. Rx can be written for 6 mos up to one(1) year. Being it's now [place], they may only approve it for 6 mos but if you wrote RX for one year, they may consider it. Doc, I want my Rx to be filled at [facility], [address] advised me to contact you even before I see you on Thurs so Pharmacy can get approval [facility] will only fill WRITTEN Rx. To be continued next text. ........... Daysi Amador

## 2019-06-27 ENCOUNTER — TELEPHONE (OUTPATIENT)
Dept: ENDOCRINOLOGY | Age: 64
End: 2019-06-27

## 2019-06-27 ENCOUNTER — OFFICE VISIT (OUTPATIENT)
Dept: ENDOCRINOLOGY | Age: 64
End: 2019-06-27

## 2019-06-27 VITALS
SYSTOLIC BLOOD PRESSURE: 140 MMHG | BODY MASS INDEX: 28.85 KG/M2 | HEIGHT: 61 IN | HEART RATE: 79 BPM | DIASTOLIC BLOOD PRESSURE: 80 MMHG | WEIGHT: 152.8 LBS

## 2019-06-27 DIAGNOSIS — I10 ESSENTIAL HYPERTENSION, BENIGN: ICD-10-CM

## 2019-06-27 DIAGNOSIS — E11.21 TYPE 2 DIABETES WITH NEPHROPATHY (HCC): Primary | ICD-10-CM

## 2019-06-27 DIAGNOSIS — E55.9 VITAMIN D DEFICIENCY: ICD-10-CM

## 2019-06-27 DIAGNOSIS — E83.52 HYPERCALCEMIA: ICD-10-CM

## 2019-06-27 DIAGNOSIS — E66.3 OVERWEIGHT: ICD-10-CM

## 2019-06-27 DIAGNOSIS — E78.5 HYPERLIPIDEMIA LDL GOAL <100: ICD-10-CM

## 2019-06-27 LAB — HBA1C MFR BLD HPLC: 7.4 %

## 2019-06-27 RX ORDER — METFORMIN HYDROCHLORIDE 500 MG/1
TABLET, EXTENDED RELEASE ORAL
Qty: 540 TAB | Refills: 1 | Status: SHIPPED | OUTPATIENT
Start: 2019-06-27 | End: 2020-07-30 | Stop reason: ALTCHOICE

## 2019-06-27 RX ORDER — LISINOPRIL 20 MG/1
TABLET ORAL
Qty: 180 TAB | Refills: 1 | Status: SHIPPED | OUTPATIENT
Start: 2019-06-27 | End: 2019-06-27 | Stop reason: SDUPTHER

## 2019-06-27 RX ORDER — PRAVASTATIN SODIUM 10 MG/1
10 TABLET ORAL
Qty: 180 TAB | Refills: 1 | Status: SHIPPED | OUTPATIENT
Start: 2019-06-27 | End: 2020-07-30 | Stop reason: SDUPTHER

## 2019-06-27 RX ORDER — PHENTERMINE HYDROCHLORIDE 37.5 MG/1
TABLET ORAL
Qty: 180 TAB | Refills: 1 | Status: SHIPPED | OUTPATIENT
Start: 2019-06-27 | End: 2020-07-30 | Stop reason: SDUPTHER

## 2019-06-27 RX ORDER — INSULIN GLARGINE 100 [IU]/ML
INJECTION, SOLUTION SUBCUTANEOUS
COMMUNITY
End: 2021-09-27

## 2019-06-27 RX ORDER — LISINOPRIL 20 MG/1
TABLET ORAL
Qty: 180 TAB | Refills: 1 | Status: SHIPPED | OUTPATIENT
Start: 2019-06-27 | End: 2020-07-30 | Stop reason: SDUPTHER

## 2019-06-27 RX ORDER — BLOOD-GLUCOSE METER
KIT MISCELLANEOUS
Qty: 800 STRIP | Refills: 1 | Status: SHIPPED | OUTPATIENT
Start: 2019-06-27

## 2019-06-27 RX ORDER — GEMFIBROZIL 600 MG/1
TABLET, FILM COATED ORAL
Qty: 180 TAB | Refills: 1 | Status: SHIPPED | OUTPATIENT
Start: 2019-06-27 | End: 2020-07-30 | Stop reason: SDUPTHER

## 2019-06-27 NOTE — TELEPHONE ENCOUNTER
Mariajose,    Please initiate a PA for her Lantus through [facility]. Also can you check with [employer] as we faxed a refill request for patient assistance back in May and have never received her supply? Is she no longer eligible with them? If she is, please have them ship to us ASA as she is leaving the country on 7/5/19.

## 2019-06-27 NOTE — PROGRESS NOTES
Chief Complaint   Patient presents with    Diabetes     PCP and Pharmacy verified     History of Present Illness: [place] is a 59 y.o. female here for follow up of diabetes. Weight down 3 lbs since last visit in 1/19. With switching from lantus to basaglar has noticed increased hunger and some weight gain and some blistering on her fingertips with peeling and has had increased itching on her scalp. Still taking trulicity once a week and humalog 6-8 units with meals but sometimes just 3-4 units. Still taking 3 tabs of metformin per day. Has been using 35-45 units of basaglar per day. Has seen higher readings with being on the basaglar sometimes over 200 but most are still under 150. Just got back home from [facility] 2 days ago and are headed back on July 5-7. Compliant with all meds below. Current Outpatient Medications   Medication Sig    insulin glargine (BASAGLAR KWIKPEN U-100 INSULIN) 100 unit/mL (3 mL) inpn by SubCUTAneous route. 35- 45 units every evening    TRULICITY 9.93 OX/2.7 mL sub-q pen 0.5 mL by SubCUTAneous route every seven (7) days. [place]--please fill a supply for 180 days    metFORMIN ER (GLUCOPHAGE XR) 500 mg tablet Take 1 tab in the morning and 2 tabs at night    pravastatin (PRAVACHOL) 10 mg tablet Take 1 Tab by mouth nightly.  gemfibrozil (LOPID) 600 mg tablet Take 1 tab at night    lisinopril (PRINIVIL, ZESTRIL) 20 mg tablet TAKE ONE TABLET BY MOUTH ONCE DAILY.  FREESTYLE LITE STRIPS strip Test 4 times daily--Dx: E11.65--[place] is going [place] for the next 6 [place]--please fill a supply for 180 days    phentermine (ADIPEX-P) 37.5 mg tablet Take 1 tablet before breakfast -- [place] is going [place] for the next 6 [place]--please fill a supply for 180 days    CETIRIZINE HCL (ZYRTEC PO) Take  by mouth daily.  b complex vitamins tablet Take 1 Tab by mouth daily.     insulin lispro (HUMALOG KWIKPEN) 100 unit/mL kwikpen 4 units with breakfast and 6 units with lunch and dinner. Max 45 units per day--Dose change 6/22/17--updated med list--did not send prescription to the pharmacy (Patient taking differently: 4 units with breakfast and 6 units with lunch and dinner. Max 45 units per day--Dose change 6/22/17--updated med list--did not send prescription to the pharmacy  Indications: Sliding scale)    cholecalciferol (VITAMIN D3) 1,000 unit tablet Take 2,000 Units by mouth daily.  FREESTYLE LITE METER monitoring kit Test 4 times daily--Dx: E11.65    coenzyme q10 (CO Q-10) 100 mg Cap Take 100 mg by mouth daily.  aspirin 81 mg tablet Take 81 mg by mouth daily.  LANTUS SOLOSTAR U-100 INSULIN 100 unit/mL (3 mL) inpn Use as directed up to 50 units per day--She is going [place] for the next 12 months--please fill a supply for 360 days     No current facility-administered medications for this visit. Allergies   Allergen Reactions    Carrot Swelling    Apple Swelling     Apple skin causes her lips to swell    Gemfibrozil Other (comments)     Back and flank pain that has improved with stopping when taking in combination with pravastatin    Simvastatin Myalgia     Review of Systems:  - Eyes: no blurry vision or double vision  - Cardiovascular: no chest pain  - Respiratory: no shortness of breath  - Musculoskeletal: no myalgias  - Neurological: no numbness/tingling in extremities    Physical Examination:  Blood pressure 140/80, pulse 79, height 5' 1\" (1.549 m), weight 152 lb 12.8 oz (69.3 kg), last menstrual period 06/28/2010.  - General: pleasant, no distress, good eye contact   - Neck: no carotid bruits  - Cardiovascular: regular, normal rate, nl s1 and s2, no m/r/g,   - Respiratory: clear bilaterally  - Integumentary: no edema,   - Psychiatric: normal mood and affect    Data Reviewed:   Component      Latest Ref Rng & Units 6/27/2019          10:37 AM   Hemoglobin A1c (POC)      % 7.4       Assessment/Plan:     1.  DM w/o complication type II, uncontrolled (250.02) her most recent Hgb A1c was 7.4% in 6/19 up from 6.8% in 9/18 down form 7.1% in 2/18 up from 6.6% in 6/17 down from 7.9% in 1/17 up from 7.8% in 5/16 down from 8.9% in 2/16 up from 8.1% in 5/15 up from 7.5% in 1/15 down from 8.1% in 7/14 up from 7.1% in 4/14 in [facility] down from 7.2% in 11/13 up from 6.9% in 8/13 down from 7.3% in 4/13 up from 6.9% in Nov up from 6.2% in August down from 6.5% in April up from 6.4% in December down from 6.9% in May 2011 down from 7.9% in November 2010 down from 9.2% in March 2010 prior to starting insulin. A1c is slightly higher since being on basaglar so will try to get her approved to go back on Lantus. - change back to Lantus 35-40 units at bedtime     - cont Humalog 4-6 units with meals  - cont Metformin  mg in am and 1g in pm  - cont trulicity 0.64 mg weekly  - foot exam done 1/19  - optho UTD 4/19  - microalbumin 153 11/10 down to 30.1 in 1/12, up to 58 in 4/13 (increased lis to 20 at that time) and down to 25 in 8/13, up to 47 in 5/15 and stable at 47 in 5/16 and 43 in 6/17 and 47 in 9/18  - check bs 3-4x day       2. Unspecified essential hypertension (401.9) her BP was just above goal < 140/90   - cont lisinopril 20 mg daily       3. Other and unspecified hyperlipidemia (272.4) Given DM, Goal LDL < 100, non-HDL < 130, and TG < 150. Myalgias with simvastatin. Was changed from gemfibrozil to pravastatin in May 2012.  with non-HDL of 149 at that time off therapy down to 62 and 126 in August. TGs > 500 in 11/12 and down to 353 in 4/13 but up to 741 in 8/13 so restarted gemfibrozil at that time. TG down to 285 in 11/13. Up to 343 in 7/14. Down to 183 in 1/15. LDL 85 and  in 5/15.  and  in 2/16. LDL 65 and  in 5/16 with lower A1c. LDL 87 and  in 1/17. LDL 72 and  in 6/17.   Having some memory loss so decreased dose to 1/2 tab daily and stopped lopid due to myalgias and both symptoms are better. Restarted lopid in 2/18 when TG 2720 but had more myalgias on bid dosing so decreased to daily dosing and  and  in 9/18  - cont lopid 600 mg daily  - cont prava 10 mg daily  - check lipids and cmp today       4. Unspecified vitamin D deficiency (268.9) Level was 19 in November 2010 on 2000 units daily so increased to 4000 units daily and it was up to 26.3 in May 2011. On 5000 units daily her level was 24 in January 2012 so I increased her to 10,000 units daily at that time and level was 68 in April 2012 and 62 in August and 72 in November and 69 in 4/13 so decreased her dose to 5000 units at that time and level 40 in 8/13. Calcium level was up in 11/13 to 11.0 so stopped vitamin D at that time and level 32 in 5/15. Down to 24 in 2/16 and 28 in 1/17. Restarted 1000 units daily and up to 31.5 in 6/17 and 51 in 9/18  - cont vitamin D 1000 units 2 tabs daily  - check Vitamin D 25-OH level today       5. Hypercalcemia: Had a level of 10.4 in 1/12 and no other abnormal values until 10.5 in 8/13 and up to 11 in 11/13. Stopped vitamin D and mvi at that time. Down to 10 in 6/14. Found to have a 2 mm stone on CT scan in [facility] in 4/14. Will hold on further evaluation given she doesn't have insurance at this time but will plan on drawing a PTH level in the future. Repeat calcium was 11 in 1/15 and 10.4 in 5/15. Up to 10.9 in 2/16 but down to 10.2 in 5/16 and in 1/17 and 9.7 in 6/17 and 10 in 9/18  - follow on cmp    6. Obesity: weight down 8 lbs from 5/15 to 2/16 and 2 lbs by 5/16 but only taking 1/2 tab in am consistently and down 5 lbs by 1/17. Wt up 1 lb by 6/17 and down 2 lbs by 2/18 and up 5 lbs by 1/19, down 3 lbs by 6/19.  - cont phentermine 37.5 mg 1 whole tab daily  - trulicity as above      Patient Instructions   1) I will have [facility] that was supposed to be shipped to us in May and we don't have this here today.   We'll also contact [employer] about the approval for the lantus. 2) Your A1c was up slightly from 6.8% to 7.4% but hopefully with getting back on lantus, we'll get this back down. 3)  I will send you a message through [employer] with your lab results. Follow-up and Dispositions    · Return when back in the [facility]. Copy sent to:  Felipe Liang MD    Lab follow up: 6/29/19  Component      Latest Ref Rng & Units 6/27/2019 6/27/2019 6/27/2019           1:28 PM  1:28 PM  1:28 PM   Glucose      65 - 99 mg/dL   180 (H)   BUN      8 - 27 mg/dL   13   Creatinine      0.57 - 1.00 mg/dL   0.57   GFR est non-AA      >59 mL/min/1.73   98   GFR est AA      >59 mL/min/1.73   113   BUN/Creatinine ratio      12 - 28   23   Sodium      134 - 144 mmol/L   141   Potassium      3.5 - 5.2 mmol/L   4.4   Chloride      96 - 106 mmol/L   107 (H)   CO2      20 - 29 mmol/L   20   Calcium      8.7 - 10.3 mg/dL   9.7   Protein, total      6.0 - 8.5 g/dL   7.2   Albumin      3.6 - 4.8 g/dL   4.4   GLOBULIN, TOTAL      1.5 - 4.5 g/dL   2.8   A-G Ratio      1.2 - 2.2   1.6   Bilirubin, total      0.0 - 1.2 mg/dL   <0.2   Alk. phosphatase      39 - 117 IU/L   102   AST      0 - 40 IU/L   19   ALT (SGPT)      0 - 32 IU/L   21   Cholesterol, total      100 - 199 mg/dL  163    Triglyceride      0 - 149 mg/dL  293 (H)    HDL Cholesterol      >39 mg/dL  35 (L)    VLDL, calculated      5 - 40 mg/dL  59 (H)    LDL, calculated      0 - 99 mg/dL  69    VITAMIN D, 25-HYDROXY      30.0 - 100.0 ng/mL 32.5       Sent her the following message through [employer]:    Your vitamin D is normal.  Keep taking 2000 units daily.  -------------------------------------------------------------------------------------------------------------------  Total Cholesterol is the total number of cholesterol particles in your blood. Goal is less than 200. Triglycerides are the short term fats in your blood. Goal is less than 150. HDL is the good cholesterol in your blood.   Goal is more than 50 if you are a woman and 40 if you are a man. LDL is the bad cholesterol in your blood. Goal is less than 100 unless you have heart disease and then goal is under 70. Continue to follow a low cholesterol diet. Try to limit the amount of fried foods, fatty foods, butter, gravy, red meat, ice cream, cheese, and eggs in your diet, which are all high in cholesterol.  -------------------------------------------------------------------------------------------------------------------  BUN and creatinine are markers of kidney function. Your values are normal.  -------------------------------------------------------------------------------------------------------------------  ALT and AST are markers of liver function.   Your values are normal.

## 2019-06-27 NOTE — PATIENT INSTRUCTIONS
1) I will have [address] about your supply that was supposed to be shipped to us in May and we don't have this here today. We'll also contact [employer] about the approval for the lantus. 2) Your A1c was up slightly from 6.8% to 7.4% but hopefully with getting back on lantus, we'll get this back down. 3)  I will send you a message through [employer] with your lab results.

## 2019-06-27 NOTE — TELEPHONE ENCOUNTER
----- Message from Bradley Reeves sent at 6/27/2019 12:15 PM EDT -----  Regarding: Dr Christine Justice refill  Liz Contreras from  [facility]) [phone],HAMZAH is currently at the pharmacy and would like to know if they can do a early refill for pt Phentermine medication  Would like a call back as soon as possible

## 2019-06-27 NOTE — TELEPHONE ENCOUNTER
Called and spoke to [facility] with [employer] Patient Assistance inquiring about Mrs. Mendez's refill request for her Lantus Solostar insulin that was sent on 5/8/2019. Carolyn informed me that Mrs. Mendez's application from the previous approval ended in April 2019 and that she would need to reapply for patient assistance as a whole. Carolyn stated that she would fax over the application to the office for Mrs. Mendez to fill out as well as Dr. Thanh Collins. Initiated a prior authorization through [facility] for Mrs. Holloways Lantus Solostar on 6/27/19.

## 2019-06-28 LAB
25(OH)D3+25(OH)D2 SERPL-MCNC: 32.5 NG/ML (ref 30–100)
ALBUMIN SERPL-MCNC: 4.4 G/DL (ref 3.6–4.8)
ALBUMIN/GLOB SERPL: 1.6 {RATIO} (ref 1.2–2.2)
ALP SERPL-CCNC: 102 IU/L (ref 39–117)
ALT SERPL-CCNC: 21 IU/L (ref 0–32)
AST SERPL-CCNC: 19 IU/L (ref 0–40)
BILIRUB SERPL-MCNC: <0.2 MG/DL (ref 0–1.2)
BUN SERPL-MCNC: 13 MG/DL (ref 8–27)
BUN/CREAT SERPL: 23 (ref 12–28)
CALCIUM SERPL-MCNC: 9.7 MG/DL (ref 8.7–10.3)
CHLORIDE SERPL-SCNC: 107 MMOL/L (ref 96–106)
CHOLEST SERPL-MCNC: 163 MG/DL (ref 100–199)
CO2 SERPL-SCNC: 20 MMOL/L (ref 20–29)
CREAT SERPL-MCNC: 0.57 MG/DL (ref 0.57–1)
GLOBULIN SER CALC-MCNC: 2.8 G/DL (ref 1.5–4.5)
GLUCOSE SERPL-MCNC: 180 MG/DL (ref 65–99)
HDLC SERPL-MCNC: 35 MG/DL
INTERPRETATION, 910389: NORMAL
LDLC SERPL CALC-MCNC: 69 MG/DL (ref 0–99)
Lab: NORMAL
POTASSIUM SERPL-SCNC: 4.4 MMOL/L (ref 3.5–5.2)
PROT SERPL-MCNC: 7.2 G/DL (ref 6–8.5)
SODIUM SERPL-SCNC: 141 MMOL/L (ref 134–144)
TRIGL SERPL-MCNC: 293 MG/DL (ref 0–149)
VLDLC SERPL CALC-MCNC: 59 MG/DL (ref 5–40)

## 2019-06-29 NOTE — TELEPHONE ENCOUNTER
Tried to submit the authorization for the Lantus through covermymeds but received the following response:    Authorization already on file for this request.    Can you call her pharmacy and find out if the request went through at [facility]? If it didn't please call [employer] at [phone] and find out how we can try to get her med approved.

## 2019-07-01 RX ORDER — DULAGLUTIDE 0.75 MG/.5ML
0.75 INJECTION, SOLUTION SUBCUTANEOUS
Qty: 52 SYRINGE | Refills: 0 | Status: SHIPPED | OUTPATIENT
Start: 2019-07-01 | End: 2021-05-10 | Stop reason: SDUPTHER

## 2019-07-01 RX ORDER — INSULIN LISPRO 100 [IU]/ML
INJECTION, SOLUTION INTRAVENOUS; SUBCUTANEOUS
Qty: 105 ML | Refills: 0 | Status: SHIPPED | OUTPATIENT
Start: 2019-07-01 | End: 2021-03-31 | Stop reason: SDUPTHER

## 2019-07-01 NOTE — TELEPHONE ENCOUNTER
I wrote a year supply of humalog and trulicity and [facility]

## 2019-07-01 NOTE — TELEPHONE ENCOUNTER
Pt notified of message per Dr. Damaris Cooper and voiced understanding of what was read to her. She would like humalog and trulicity also sent to the [facility] in [place].  She stated she is traveling from [place] to [place]

## 2019-09-27 ENCOUNTER — TELEPHONE (OUTPATIENT)
Dept: FAMILY PLANNING/WOMEN'S HEALTH CLINIC | Age: 64
End: 2019-09-27

## 2019-09-27 NOTE — TELEPHONE ENCOUNTER
Left patient a message to call Damion to reschdeule the [facility] appointment she missed on 9/24/19. Looks like she may have insurance so asked her to call [facility] if that is the case.

## 2020-07-30 DIAGNOSIS — E66.3 OVERWEIGHT: ICD-10-CM

## 2020-07-30 RX ORDER — METFORMIN HYDROCHLORIDE 500 MG/1
TABLET ORAL
Qty: 540 TAB | Refills: 1 | Status: SHIPPED | OUTPATIENT
Start: 2020-07-30 | End: 2021-03-31 | Stop reason: SDUPTHER

## 2020-07-30 RX ORDER — PRAVASTATIN SODIUM 10 MG/1
10 TABLET ORAL
Qty: 180 TAB | Refills: 1 | Status: SHIPPED | OUTPATIENT
Start: 2020-07-30 | End: 2021-03-31 | Stop reason: SDUPTHER

## 2020-07-30 RX ORDER — GEMFIBROZIL 600 MG/1
TABLET, FILM COATED ORAL
Qty: 180 TAB | Refills: 1 | Status: SHIPPED | OUTPATIENT
Start: 2020-07-30 | End: 2021-03-31 | Stop reason: SDUPTHER

## 2020-07-30 RX ORDER — PHENTERMINE HYDROCHLORIDE 37.5 MG/1
TABLET ORAL
Qty: 180 TAB | Refills: 0 | Status: SHIPPED | OUTPATIENT
Start: 2020-07-30 | End: 2021-09-27 | Stop reason: SDUPTHER

## 2020-07-30 RX ORDER — LISINOPRIL 20 MG/1
TABLET ORAL
Qty: 180 TAB | Refills: 1 | Status: SHIPPED | OUTPATIENT
Start: 2020-07-30 | End: 2021-03-31 | Stop reason: SDUPTHER

## 2020-09-03 NOTE — TELEPHONE ENCOUNTER
Please let her know I will approve an early refill given [place] is [place] the [place] and this was clearly written on the prescription that I gave to the patient today to give to the pharmacist. Additional Notes: Pt interested in referral to surgeon to consider excision Detail Level: Simple

## 2021-04-01 RX ORDER — INSULIN GLARGINE 100 [IU]/ML
INJECTION, SOLUTION SUBCUTANEOUS
Qty: 90 ML | Refills: 1 | Status: SHIPPED | OUTPATIENT
Start: 2021-04-01 | End: 2022-03-11 | Stop reason: SDUPTHER

## 2021-04-01 RX ORDER — INSULIN LISPRO 100 [IU]/ML
INJECTION, SOLUTION INTRAVENOUS; SUBCUTANEOUS
Qty: 105 ML | Refills: 1 | Status: SHIPPED | OUTPATIENT
Start: 2021-04-01 | End: 2022-03-11 | Stop reason: SDUPTHER

## 2021-04-01 RX ORDER — PRAVASTATIN SODIUM 10 MG/1
10 TABLET ORAL
Qty: 180 TAB | Refills: 1 | Status: SHIPPED | OUTPATIENT
Start: 2021-04-01 | End: 2021-09-27 | Stop reason: SDUPTHER

## 2021-04-01 RX ORDER — GEMFIBROZIL 600 MG/1
TABLET, FILM COATED ORAL
Qty: 180 TAB | Refills: 1 | Status: SHIPPED | OUTPATIENT
Start: 2021-04-01 | End: 2021-09-27 | Stop reason: SDUPTHER

## 2021-04-01 RX ORDER — LISINOPRIL 20 MG/1
TABLET ORAL
Qty: 180 TAB | Refills: 1 | Status: SHIPPED | OUTPATIENT
Start: 2021-04-01 | End: 2021-09-27 | Stop reason: SDUPTHER

## 2021-04-01 RX ORDER — METFORMIN HYDROCHLORIDE 500 MG/1
TABLET ORAL
Qty: 540 TAB | Refills: 1 | Status: SHIPPED | OUTPATIENT
Start: 2021-04-01 | End: 2021-09-27 | Stop reason: SDUPTHER

## 2021-05-10 RX ORDER — DULAGLUTIDE 0.75 MG/.5ML
0.75 INJECTION, SOLUTION SUBCUTANEOUS
Qty: 52 SYRINGE | Refills: 0 | Status: SHIPPED | OUTPATIENT
Start: 2021-05-10 | End: 2021-09-27 | Stop reason: DRUGHIGH

## 2021-08-31 DIAGNOSIS — I10 ESSENTIAL HYPERTENSION, BENIGN: ICD-10-CM

## 2021-08-31 DIAGNOSIS — E78.5 HYPERLIPIDEMIA LDL GOAL <100: ICD-10-CM

## 2021-08-31 DIAGNOSIS — E11.65 UNCONTROLLED TYPE 2 DIABETES MELLITUS WITH HYPERGLYCEMIA (HCC): Primary | ICD-10-CM

## 2021-08-31 DIAGNOSIS — E55.9 VITAMIN D DEFICIENCY: ICD-10-CM

## 2021-09-18 LAB
25(OH)D3+25(OH)D2 SERPL-MCNC: 31.1 NG/ML (ref 30–100)
ALBUMIN SERPL-MCNC: 5 G/DL (ref 3.8–4.8)
ALBUMIN/CREAT UR: 96 MG/G CREAT (ref 0–29)
ALBUMIN/GLOB SERPL: 1.5 {RATIO} (ref 1.2–2.2)
ALP SERPL-CCNC: 100 IU/L (ref 44–121)
ALT SERPL-CCNC: 92 IU/L (ref 0–32)
AST SERPL-CCNC: 94 IU/L (ref 0–40)
BILIRUB SERPL-MCNC: 0.3 MG/DL (ref 0–1.2)
BUN SERPL-MCNC: 19 MG/DL (ref 8–27)
BUN/CREAT SERPL: 24 (ref 12–28)
CALCIUM SERPL-MCNC: 10.1 MG/DL (ref 8.7–10.3)
CHLORIDE SERPL-SCNC: 102 MMOL/L (ref 96–106)
CHOLEST SERPL-MCNC: 198 MG/DL (ref 100–199)
CO2 SERPL-SCNC: 18 MMOL/L (ref 20–29)
CREAT SERPL-MCNC: 0.8 MG/DL (ref 0.57–1)
CREAT UR-MCNC: 64.8 MG/DL
EST. AVERAGE GLUCOSE BLD GHB EST-MCNC: 194 MG/DL
GLOBULIN SER CALC-MCNC: 3.4 G/DL (ref 1.5–4.5)
GLUCOSE SERPL-MCNC: 221 MG/DL (ref 65–99)
HBA1C MFR BLD: 8.4 % (ref 4.8–5.6)
HDLC SERPL-MCNC: 32 MG/DL
IMP & REVIEW OF LAB RESULTS: NORMAL
LDLC SERPL CALC-MCNC: 93 MG/DL (ref 0–99)
MICROALBUMIN UR-MCNC: 62 UG/ML
POTASSIUM SERPL-SCNC: 5 MMOL/L (ref 3.5–5.2)
PROT SERPL-MCNC: 8.4 G/DL (ref 6–8.5)
SODIUM SERPL-SCNC: 137 MMOL/L (ref 134–144)
TRIGL SERPL-MCNC: 439 MG/DL (ref 0–149)
VLDLC SERPL CALC-MCNC: 73 MG/DL (ref 5–40)

## 2021-09-27 ENCOUNTER — OFFICE VISIT (OUTPATIENT)
Dept: ENDOCRINOLOGY | Age: 66
End: 2021-09-27
Payer: COMMERCIAL

## 2021-09-27 VITALS
SYSTOLIC BLOOD PRESSURE: 118 MMHG | HEART RATE: 75 BPM | DIASTOLIC BLOOD PRESSURE: 71 MMHG | BODY MASS INDEX: 30.02 KG/M2 | WEIGHT: 159 LBS | HEIGHT: 61 IN

## 2021-09-27 DIAGNOSIS — E55.9 VITAMIN D DEFICIENCY: ICD-10-CM

## 2021-09-27 DIAGNOSIS — E66.3 OVERWEIGHT: ICD-10-CM

## 2021-09-27 DIAGNOSIS — E11.21 TYPE 2 DIABETES WITH NEPHROPATHY (HCC): Primary | ICD-10-CM

## 2021-09-27 DIAGNOSIS — E78.5 HYPERLIPIDEMIA LDL GOAL <100: ICD-10-CM

## 2021-09-27 DIAGNOSIS — I10 ESSENTIAL HYPERTENSION, BENIGN: ICD-10-CM

## 2021-09-27 PROCEDURE — 3052F HG A1C>EQUAL 8.0%<EQUAL 9.0%: CPT | Performed by: INTERNAL MEDICINE

## 2021-09-27 PROCEDURE — 99214 OFFICE O/P EST MOD 30 MIN: CPT | Performed by: INTERNAL MEDICINE

## 2021-09-27 RX ORDER — DULAGLUTIDE 1.5 MG/.5ML
1.5 INJECTION, SOLUTION SUBCUTANEOUS
Qty: 6 ML | Refills: 3 | Status: SHIPPED | OUTPATIENT
Start: 2021-09-27 | End: 2022-03-11 | Stop reason: SDUPTHER

## 2021-09-27 RX ORDER — PHENTERMINE HYDROCHLORIDE 37.5 MG/1
TABLET ORAL
Qty: 180 TABLET | Refills: 0 | Status: SHIPPED | OUTPATIENT
Start: 2021-09-27 | End: 2022-09-08 | Stop reason: SDUPTHER

## 2021-09-27 RX ORDER — GEMFIBROZIL 600 MG/1
TABLET, FILM COATED ORAL
Qty: 90 TABLET | Refills: 3 | Status: SHIPPED | OUTPATIENT
Start: 2021-09-27

## 2021-09-27 RX ORDER — METFORMIN HYDROCHLORIDE 500 MG/1
TABLET ORAL
Qty: 270 TABLET | Refills: 3 | Status: SHIPPED | OUTPATIENT
Start: 2021-09-27

## 2021-09-27 RX ORDER — PEN NEEDLE, DIABETIC 31 GX3/16"
NEEDLE, DISPOSABLE MISCELLANEOUS
Qty: 400 PEN NEEDLE | Refills: 3 | Status: SHIPPED | OUTPATIENT
Start: 2021-09-27

## 2021-09-27 RX ORDER — LISINOPRIL 20 MG/1
TABLET ORAL
Qty: 90 TABLET | Refills: 3 | Status: SHIPPED | OUTPATIENT
Start: 2021-09-27

## 2021-09-27 RX ORDER — PRAVASTATIN SODIUM 10 MG/1
10 TABLET ORAL
Qty: 90 TABLET | Refills: 3 | Status: SHIPPED | OUTPATIENT
Start: 2021-09-27

## 2021-09-27 NOTE — PATIENT INSTRUCTIONS
1) With your next dose of Trulicity on Sunday, plan on taking 2 of the 0.75 mg pens back to back in different location. Provided you tolerate this dose, then [place]. 2) Your cholesterol was higher due to 7 lb wt gain and your sugars being higher and I hope with the higher dose of trulicity and getting back on phentermine that your weight will come down and so will your sugar and cholesterol. 3) ALT and AST are markers of liver function. Your values are likely elevated due to fatty deposition in the liver that can occur with weight gain. Hopefully with weight loss, these values will return to normal.    4) BUN and creatinine are markers of kidney function. Your values are normal.    5) Your vitamin D was normal and blood pressure was controlled. 6) Do your best to take the humalog 5-10 minutes BEFORE you eat to prevent your sugar from spiking.

## 2021-09-27 NOTE — PROGRESS NOTES
Chief Complaint   Patient presents with    Diabetes     History of Present Illness: [place] is a 77 y.o. female here for follow up of diabetes. This is her 1st visit back since June 2019 and has been in [facility] ever since and will be heading back in 2 days. Never contracted COVID that she knows of and has been fully vaccinated. There was an issue with her being able to get trulicity on a regular basis [place] so has been consistently getting this every Sunday the past 4 months. Has remained on lantus 45 units at night and humalog 8 units before meals and metformin 1 tab in am and 2 tabs at night. Fasting sugars can be in the 110-130 range and other times closer to 150 but has seen some readings over 200 based on what she ate the night before. Has not been missing her insulin but can take the humalog 15 min after eating. Occasionally her lantus pen is running low and may just take 38 units rather than 45 units. Has not been drinking any ETOH or taking tylenol so likely her rise in LFTs are from uncontrolled DM and weight gain. Has not been able to get phentermine and previously was taking 1/2 tab daily and this helped with her weight and appetite so hopes to try and pick some up before flying back to [facility]. Current Outpatient Medications   Medication Sig    Trulicity 2.00 CK/3.4 mL sub-q pen 0.5 mL by SubCUTAneous route every seven (7) days. [place]--please fill a supply for 360 days    Lantus Solostar U-100 Insulin 100 unit/mL (3 mL) inpn Use as directed up to 50 units per day--[place] is [place] for the next 6 months    insulin lispro (HumaLOG KwikPen Insulin) 100 unit/mL kwikpen 4 units with breakfast and 6 units with lunch and dinner. Max 35 units per day--[place] (Patient taking differently: 8 units TID.   Max 35 units per day--she is [place] for the next 6 months)    pravastatin (PRAVACHOL) 10 mg tablet Take 1 Tab by mouth nightly. [place] is [place] for the next 6 [place]    gemfibroziL (LOPID) 600 mg tablet Take 1 tab at night--[place] is [place] for the next 6 [place]    lisinopriL (PRINIVIL, ZESTRIL) 20 mg tablet TAKE 1 TABLET BY MOUTH ONCE DAILY--she is overseas for the next 6 months    metFORMIN (GLUCOPHAGE) 500 mg tablet Take 1 tab in the morning and 2 tabs at night--she is overseas for 6 months    phentermine (ADIPEX-P) 37.5 mg tablet Take 1 tablet before breakfast -- [place] is [place] for the next 6 months    b complex vitamins tablet Take 1 Tab by mouth daily.  cholecalciferol (VITAMIN D3) 1,000 unit tablet Take 2,000 Units by mouth daily.  coenzyme q10 (CO Q-10) 100 mg Cap Take 100 mg by mouth daily.  aspirin 81 mg tablet Take 81 mg by mouth daily.  FREESTYLE LITE STRIPS strip Test 4 times daily--Dx: E11.65--She is going [place] for the next 6 months--please fill a supply for 180 days    CETIRIZINE HCL (ZYRTEC PO) Take  by mouth daily.  FREESTYLE LITE METER monitoring kit Test 4 times daily--Dx: E11.65     No current facility-administered medications for this visit.      Allergies   Allergen Reactions    Carrot Swelling    Apple Swelling     Apple skin causes her lips to swell    Gemfibrozil Other (comments)     Back and flank pain that has improved with stopping when taking in combination with pravastatin  PATIENT SAYS SHE CAN TAKE THIS    Simvastatin Myalgia     Review of Systems: PER HPI    Physical Examination:  Blood pressure 118/71, pulse 75, height 5' 1\" (1.549 m), weight 159 lb (72.1 kg), last menstrual period 06/28/2010.  - General: pleasant, no distress, good eye contact   - Neck: no carotid bruits  - Cardiovascular: regular, normal rate, nl s1 and s2, no m/r/g,   - Respiratory: clear bilaterally  - Integumentary: no edema,   - Psychiatric: normal mood and affect    Diabetic foot exam:     Left Foot:   Visual Exam: normal    Pulse DP: 2+ (normal)   Filament test: normal sensation    Vibratory sensation: diminished      Right Foot:   Visual Exam: normal    Pulse DP: 2+ (normal)   Filament test: normal sensation    Vibratory sensation: diminished        Data Reviewed:   Component      Latest Ref Rng & Units 9/17/2021   Glucose      65 - 99 mg/dL 221 (H)   BUN      8 - 27 mg/dL 19   Creatinine      0.57 - 1.00 mg/dL 0.80   GFR est non-AA      >59 mL/min/1.73 77   GFR est AA      >59 mL/min/1.73 89   BUN/Creatinine ratio      12 - 28 24   Sodium      134 - 144 mmol/L 137   Potassium      3.5 - 5.2 mmol/L 5.0   Chloride      96 - 106 mmol/L 102   CO2      20 - 29 mmol/L 18 (L)   Calcium      8.7 - 10.3 mg/dL 10.1   Protein, total      6.0 - 8.5 g/dL 8.4   Albumin      3.8 - 4.8 g/dL 5.0 (H)   GLOBULIN, TOTAL      1.5 - 4.5 g/dL 3.4   A-G Ratio      1.2 - 2.2 1.5   Bilirubin, total      0.0 - 1.2 mg/dL 0.3   Alk. phosphatase      44 - 121 IU/L 100   AST      0 - 40 IU/L 94 (H)   ALT      0 - 32 IU/L 92 (H)   Cholesterol, total      100 - 199 mg/dL 198   Triglyceride      0 - 149 mg/dL 439 (H)   HDL Cholesterol      >39 mg/dL 32 (L)   VLDL, calculated      5 - 40 mg/dL 73 (H)   LDL, calculated      0 - 99 mg/dL 93   Creatinine, urine      Not Estab. mg/dL 64.8   Microalbumin, urine      Not Estab. ug/mL 62.0   Microalbumin/Creat. Ratio      0 - 29 mg/g creat 96 (H)   Hemoglobin A1c, (calculated)      4.8 - 5.6 % 8.4 (H)   Estimated average glucose      mg/dL 194   VITAMIN D, 25-HYDROXY      30.0 - 100.0 ng/mL 31.1       Assessment/Plan:     1.  DM w/o complication type II, uncontrolled (250.02) her most recent Hgb A1c was 8.4% in 9/21 up from 7.4% in 6/19 up from 6.8% in 9/18 down form 7.1% in 2/18 up from 6.6% in 6/17 down from 7.9% in 1/17 up from 7.8% in 5/16 down from 8.9% in 2/16 up from 8.1% in 5/15 up from 7.5% in 1/15 down from 8.1% in 7/14 up from 7.1% in 4/14 in [facility] down from 7.2% in 11/13 up from 6.9% in 8/13 down from 7.3% in 4/13 up from 6.9% in Nov up from 6.2% in August down from 6.5% in April up from 6.4% in December down from 6.9% in May 2011 down from 7.9% in November 2010 down from 9.2% in March 2010 prior to starting insulin. A1c is higher due to diet and weight gain and taking humalog after meals so will increase her trulicity and have her try to take humalog before she eats. - cont Lantus 45 units at bedtime     - cont Humalog 4-6 units with meals  - cont Metformin 500 mg in am and 1g in pm  - increase trulicity to 1.5 mg weekly  - foot exam done 9/21  - optho UTD 4/19  - microalbumin 153 11/10 down to 30.1 in 1/12, up to 58 in 4/13 (increased lis to 20 at that time) and down to 25 in 8/13, up to 47 in 5/15 and stable at 47 in 5/16 and 43 in 6/17 and 47 in 9/18, up to 115 in 1/19, down to 96 in 9/21  - check bs 3-4x day       2. Unspecified essential hypertension (401.9) her BP was at goal < 140/90   - cont lisinopril 20 mg daily       3. Other and unspecified hyperlipidemia (272.4) Given DM, Goal LDL < 100, non-HDL < 130, and TG < 150. Myalgias with simvastatin. Was changed from gemfibrozil to pravastatin in May 2012.  with non-HDL of 149 at that time off therapy down to 62 and 126 in August. TGs > 500 in 11/12 and down to 353 in 4/13 but up to 741 in 8/13 so restarted gemfibrozil at that time. TG down to 285 in 11/13. Up to 343 in 7/14. Down to 183 in 1/15. LDL 85 and  in 5/15.  and  in 2/16. LDL 65 and  in 5/16 with lower A1c. LDL 87 and  in 1/17. LDL 72 and  in 6/17. Having some memory loss so decreased dose to 1/2 tab daily and stopped lopid due to myalgias and both symptoms are better. Restarted lopid in 2/18 when TG 2720 but had more myalgias on bid dosing so decreased to daily dosing and  and  in 9/18. LDL 69 and  in 6/19. LDL 93 and  in 9/21  - cont lopid 600 mg daily  - cont prava 10 mg daily  - check lipids and cmp at next visit       4.  Unspecified vitamin D deficiency (268.9) Level was 19 in November 2010 on 2000 units daily so increased to 4000 units daily and it was up to 26.3 in May 2011. On 5000 units daily her level was 24 in January 2012 so I increased her to 10,000 units daily at that time and level was 68 in April 2012 and 62 in August and 72 in November and 69 in 4/13 so decreased her dose to 5000 units at that time and level 40 in 8/13. Calcium level was up in 11/13 to 11.0 so stopped vitamin D at that time and level 32 in 5/15. Down to 24 in 2/16 and 28 in 1/17. Restarted 1000 units daily and up to 31.5 in 6/17 and 51 in 9/18 and 32.5 in 6/19 and 31 in 9/21.  - cont vitamin D 1000 units 2 tabs daily  - check Vitamin D 25-OH level at next visit       5. Hypercalcemia: Had a level of 10.4 in 1/12 and no other abnormal values until 10.5 in 8/13 and up to 11 in 11/13. Stopped vitamin D and mvi at that time. Down to 10 in 6/14. Found to have a 2 mm stone on CT scan in [facility] in 4/14. Will hold on further evaluation given she doesn't have insurance at this time but will plan on drawing a PTH level in the future. Repeat calcium was 11 in 1/15 and 10.4 in 5/15. Up to 10.9 in 2/16 but down to 10.2 in 5/16 and normal ever since, last in 9/21.  - follow on cmp    6. Obesity: weight down 8 lbs from 5/15 to 2/16 and 2 lbs by 5/16 but only taking 1/2 tab in am consistently and down 5 lbs by 1/17. Wt up 1 lb by 6/17 and down 2 lbs by 2/18 and up 5 lbs by 1/19, down 3 lbs by 6/19.  Up 7 lbs by 9/21  - restart phentermine 37.5 mg 1/2 tab daily (written as 1 tab daily to last 1 year while overseas)  - trulicity as above    Patient Instructions   1) With your next dose of Trulicity on Sunday, plan on taking 2 of the 0.75 mg pens back to back in different location. Provided you tolerate this dose, then [place]       2) Your cholesterol was higher due to 7 lb wt gain and your sugars being higher and I hope with the higher dose of trulicity and getting back on phentermine that your weight will come down and so will your sugar and cholesterol. 3) ALT and AST are markers of liver function. Your values are likely elevated due to fatty deposition in the liver that can occur with weight gain. Hopefully with weight loss, these values will return to normal.    4) BUN and creatinine are markers of kidney function. Your values are normal.    5) Your vitamin D was normal and blood pressure was controlled. 6) Do your best to take the humalog 5-10 minutes BEFORE you eat to prevent your sugar from spiking. Follow-up and Dispositions    · Return When you return again from [facility].                Copy sent to:  Neal Centeno MD

## 2022-03-11 RX ORDER — INSULIN LISPRO 100 [IU]/ML
INJECTION, SOLUTION INTRAVENOUS; SUBCUTANEOUS
Qty: 15 ML | Refills: 1 | Status: SHIPPED | OUTPATIENT
Start: 2022-03-11

## 2022-03-11 RX ORDER — INSULIN GLARGINE 100 [IU]/ML
INJECTION, SOLUTION SUBCUTANEOUS
Qty: 15 ML | Refills: 1 | Status: SHIPPED | OUTPATIENT
Start: 2022-03-11 | End: 2022-03-23 | Stop reason: ALTCHOICE

## 2022-03-11 RX ORDER — DULAGLUTIDE 1.5 MG/.5ML
1.5 INJECTION, SOLUTION SUBCUTANEOUS
Qty: 2 ML | Refills: 1 | Status: SHIPPED | OUTPATIENT
Start: 2022-03-11

## 2022-03-11 NOTE — TELEPHONE ENCOUNTER
----- Message from [place] sent at 3/11/2022  4:21 AM EST -----  Regarding: INSULIN  Hi Dr Sheri Gomez,  We are on emergency flight right now heading to [place] from [place]. My brother passed and due to shock, confusion, preparation etc, I left my insulin in the refrigerator. Humalog, Lantus and Trulicity. Could I get a prescription for them. I am almost out of Humalog anyway. We will be in [place] for at least 21 days. The nearest pharmacy would be:  [facility] [place] [address]  Tel: [phone]    Thank you Doc.   Sincerely- Deidre Randall

## 2022-03-19 PROBLEM — E11.21 TYPE 2 DIABETES WITH NEPHROPATHY (HCC): Status: ACTIVE | Noted: 2018-02-22

## 2022-03-23 ENCOUNTER — TELEPHONE (OUTPATIENT)
Dept: ENDOCRINOLOGY | Age: 67
End: 2022-03-23

## 2022-03-23 RX ORDER — INSULIN GLARGINE 100 [IU]/ML
INJECTION, SOLUTION SUBCUTANEOUS
Qty: 15 ML | Refills: 1 | Status: SHIPPED | OUTPATIENT
Start: 2022-03-23

## 2022-03-23 RX ORDER — INSULIN GLARGINE 100 [IU]/ML
INJECTION, SOLUTION SUBCUTANEOUS
Qty: 15 ML | Refills: 11 | Status: SHIPPED | OUTPATIENT
Start: 2022-03-23 | End: 2022-03-23 | Stop reason: ALTCHOICE

## 2022-03-23 NOTE — TELEPHONE ENCOUNTER
Per SP(pharmacy tech)  The basaglar did go through and patient was notified. I spoke with patient and she stated that she cannot take the basaglar. She stated a few years ago she had a reaction to the [place] and she was switched back to the lantus by way of a PA.

## 2022-03-23 NOTE — TELEPHONE ENCOUNTER
I had forgotten about this. Since she needs the PA right away, can you call the number in the message below and let them know she has tried their preferred medication, basaglar From Feb 2019 to June 2019 and had more fatigue and weight gain and increased appetite and higher sugars and these symptoms went away with changing back to Lantus and for medical necessity, she needs to remain on Lantus. Thanks so much.

## 2022-03-23 NOTE — TELEPHONE ENCOUNTER
3/23/2022  11:23 AM    Patient called due to needing prior auth for her lantus prescription- the pharmacy told her to have the office call [phone] to give authorization rather than faxing due to her running out today and faxing will take days to process. Please call the number to give prior authorization.

## 2022-03-23 NOTE — TELEPHONE ENCOUNTER
She is currently up in [place] due to a family emergency. Are you able to call the pharmacy in [place] where I sent the lantus and find out if they switch this to basaglar with the same directions and quantity, will this go through without needing to do a prior authorization? I sent a prescription for basaglar to run this. Let her know if this works out and if so, she'll take this the same way as the lantus.

## 2022-03-23 NOTE — TELEPHONE ENCOUNTER
Per Joshua Winslow the [facility] was approved for 1 year effective today until 3/23/22. The case number is [id number]. Patient has been notified and voiced understanding.

## 2022-09-08 DIAGNOSIS — E66.3 OVERWEIGHT: ICD-10-CM

## 2022-09-08 RX ORDER — PHENTERMINE HYDROCHLORIDE 37.5 MG/1
TABLET ORAL
Qty: 180 TABLET | Refills: 0 | Status: SHIPPED | OUTPATIENT
Start: 2022-09-08

## 2022-09-08 NOTE — TELEPHONE ENCOUNTER
9/8/2022  10:42 AM    Pt called and stated that she needs a RX refilled of the phentermine 37.5mg. she is currently in [place] and is leaving to go back to [facility] early Monday morning. [facility], I added it to her list. If you have any questions please contact her at [phone].      Thank you,   Terri Singh

## 2022-09-08 NOTE — TELEPHONE ENCOUNTER
Requested Prescriptions     Pending Prescriptions Disp Refills    phentermine (ADIPEX-P) 37.5 mg tablet 180 Tablet 0     Sig: Take 1 tablet before breakfast -- [place]

## 2023-01-31 RX ORDER — BLOOD-GLUCOSE METER
KIT MISCELLANEOUS
Qty: 600 STRIP | Refills: 3 | Status: SHIPPED | OUTPATIENT
Start: 2023-01-31

## 2023-01-31 RX ORDER — PEN NEEDLE, DIABETIC 31 GX3/16"
NEEDLE, DISPOSABLE MISCELLANEOUS
Qty: 400 PEN NEEDLE | Refills: 3 | Status: SHIPPED | OUTPATIENT
Start: 2023-01-31

## 2024-03-30 NOTE — TELEPHONE ENCOUNTER
Please call Rogelio Urena about her income verification. It appears you faxed this on 6/14/17 but they state they don't have this. Can you please refax this and call them to ensure the receive this? Thanks. General